# Patient Record
Sex: MALE | Race: ASIAN | NOT HISPANIC OR LATINO | URBAN - METROPOLITAN AREA
[De-identification: names, ages, dates, MRNs, and addresses within clinical notes are randomized per-mention and may not be internally consistent; named-entity substitution may affect disease eponyms.]

---

## 2017-03-19 ENCOUNTER — OFFICE VISIT (OUTPATIENT)
Dept: URGENT CARE | Facility: URGENT CARE | Age: 42
End: 2017-03-19
Payer: COMMERCIAL

## 2017-03-19 VITALS
DIASTOLIC BLOOD PRESSURE: 79 MMHG | WEIGHT: 142.6 LBS | HEART RATE: 106 BPM | TEMPERATURE: 99.2 F | OXYGEN SATURATION: 99 % | BODY MASS INDEX: 22.33 KG/M2 | SYSTOLIC BLOOD PRESSURE: 115 MMHG

## 2017-03-19 DIAGNOSIS — R52 BODY ACHES: Primary | ICD-10-CM

## 2017-03-19 DIAGNOSIS — J06.9 UPPER RESPIRATORY TRACT INFECTION, UNSPECIFIED TYPE: ICD-10-CM

## 2017-03-19 DIAGNOSIS — R07.0 THROAT PAIN: ICD-10-CM

## 2017-03-19 LAB
DEPRECATED S PYO AG THROAT QL EIA: NORMAL
FLUAV+FLUBV AG SPEC QL: NEGATIVE
FLUAV+FLUBV AG SPEC QL: NORMAL
MICRO REPORT STATUS: NORMAL
SPECIMEN SOURCE: NORMAL
SPECIMEN SOURCE: NORMAL

## 2017-03-19 PROCEDURE — 87081 CULTURE SCREEN ONLY: CPT | Performed by: PHYSICIAN ASSISTANT

## 2017-03-19 PROCEDURE — 87804 INFLUENZA ASSAY W/OPTIC: CPT | Performed by: PHYSICIAN ASSISTANT

## 2017-03-19 PROCEDURE — 87880 STREP A ASSAY W/OPTIC: CPT | Performed by: PHYSICIAN ASSISTANT

## 2017-03-19 PROCEDURE — 99213 OFFICE O/P EST LOW 20 MIN: CPT | Performed by: PHYSICIAN ASSISTANT

## 2017-03-19 RX ORDER — CODEINE PHOSPHATE AND GUAIFENESIN 10; 100 MG/5ML; MG/5ML
1-2 SOLUTION ORAL EVERY 4 HOURS PRN
Qty: 180 ML | Refills: 0 | Status: SHIPPED | OUTPATIENT
Start: 2017-03-19 | End: 2018-01-23

## 2017-03-19 RX ORDER — BENZONATATE 200 MG/1
200 CAPSULE ORAL 3 TIMES DAILY PRN
Qty: 20 CAPSULE | Refills: 0 | Status: SHIPPED | OUTPATIENT
Start: 2017-03-19 | End: 2018-01-23

## 2017-03-19 NOTE — NURSING NOTE
"Chief Complaint   Patient presents with     URI     started 3 days ago       Initial /79  Pulse 106  Temp 99.2  F (37.3  C) (Oral)  Wt 142 lb 9.6 oz (64.7 kg)  SpO2 99%  BMI 22.33 kg/m2 Estimated body mass index is 22.33 kg/(m^2) as calculated from the following:    Height as of 11/28/16: 5' 7\" (1.702 m).    Weight as of this encounter: 142 lb 9.6 oz (64.7 kg).  Medication Reconciliation: complete   Kiel Ballesteros MA        "

## 2017-03-19 NOTE — MR AVS SNAPSHOT
After Visit Summary   3/19/2017    Stacy Ferrer    MRN: [id number]           Patient Information     Date Of Birth          1975        Visit Information        Provider Department      3/19/2017 9:20 AM Moshe Falcon PA [facility]        Today's Diagnoses     Body aches    -  1    Throat pain        Upper respiratory tract infection, unspecified type          Care Instructions    Trial over the counter symptomatic relief, rest, and drink plenty of fluids. Salt water gargles and nasal lavage may also be helpful.  Return to clinic or Emergency Department for breathing/swallowing problems, fever >105, altered mental status, or worsening general condition.      Viral Respiratory Illness:  You have an Upper Respiratory Illness (URI) caused by a virus. This illness is contagious during the first few days. It is spread through the air by coughing and sneezing or by direct contact (touching the sick person and then touching your own eyes, nose or mouth). Most viral illnesses go away within 7-10 days with rest and simple home remedies. Sometimes, the illness may last for several weeks. Antibiotics will not kill a virus and are generally not prescribed for this condition.  Home Care:  1) If symptoms are severe, rest at home for the first 2-3 days. When you resume activity, don't let yourself get too tired.  2) Avoid being exposed to cigarette smoke (yours or others ).  3) Tylenol (acetaminophen) or ibuprofen (Advil, Motrin) will help fever, muscle aching and headache. (Persons under 18 with fever should not take aspirin since this may cause liver damage.)  4) Your appetite may be poor, so a light diet is fine. Avoid dehydration by drinking 6-8 glasses of fluids per day (water, soft, drinks, juices, tea, soup, etc.). Extra fluids will help loosen secretions in the nose and lungs.  5) Over-the-counter cold medicines will not shorten the length of time you re sick, but they may  be helpful for the following symptoms: cough (Robitussin DM); sore throat (Chloraseptic lozenges or spray); nasal and sinus congestion (Actifed, Sudafed, Chlortrimeton).  Follow Up  with your doctor or as advised if you don t improve over the next week.  Get Prompt Medical Attention  if any of the following occur:  -- Cough with lots of colored sputum (mucus) or blood in your sputum  -- Chest pain, shortness of breath, wheezing or have trouble breathing  -- Severe headache; face, neck or ear pain  -- Fever over 100.4  F (38.0  C) for more than three days  -- You can t swallow due to throat pain    [id number] [facility], [address] [place] [address], [place] [address]. All rights reserved. This information is not intended as a substitute for professional medical care. Always follow your healthcare professional's instructions.              Follow-ups after your visit        Follow-up notes from your care team     Return if symptoms worsen or fail to improve.      Who to contact     If you have questions or need follow up information about today's clinic visit or your schedule please contact [facility] directly at [phone].  Normal or non-critical lab and imaging results will be communicated to you by MyChart, letter or phone within 4 business days after the clinic has received the results. If you do not hear from us within 7 days, please contact the clinic through McAfeehart or phone. If you have a critical or abnormal lab result, we will notify you by phone as soon as possible.  Submit refill requests through Videum or call your pharmacy and they will forward the refill request to us. Please allow 3 business days for your refill to be completed.          Additional Information About Your Visit        MyChart Information     Videum lets you send messages to your doctor, view your test results, renew your prescriptions, schedule appointments and more. To sign up, go to [email or web address] .  "Click on \"Log in\" on the left side of the screen, which will take you to the Welcome page. Then click on \"Sign up Now\" on the right side of the page.     You will be asked to enter the access code listed below, as well as some personal information. Please follow the directions to create your username and password.     Your access code is: [email or web address]  Expires: 2017 10:46 AM     Your access code will  in 90 days. If you need help or a new code, please call your [place] clinic or [phone].        Care EveryWhere ID     This is your Care EveryWhere ID. This could be used by other organizations to access your [place] medical records  [id number]        Your Vitals Were     Pulse Temperature Pulse Oximetry BMI (Body Mass Index)          106 99.2  F (37.3  C) (Oral) 99% 22.33 kg/m2         Blood Pressure from Last 3 Encounters:   17 115/79   16 (!) 129/95   16 116/72    Weight from Last 3 Encounters:   17 142 lb 9.6 oz (64.7 kg)   16 143 lb (64.9 kg)   16 144 lb 9.6 oz (65.6 kg)              We Performed the Following     Influenza A/B antigen     Strep, Rapid Screen          Today's Medication Changes          These changes are accurate as of: 3/19/17 10:46 AM.  If you have any questions, ask your nurse or doctor.               Start taking these medicines.        Dose/Directions    benzonatate 200 MG capsule   Commonly known as:  TESSALON   Used for:  Upper respiratory tract infection, unspecified type   Started by:  Moshe Falcon PA        Dose:  200 mg   Take 1 capsule (200 mg) by mouth 3 times daily as needed for cough   Quantity:  20 capsule   Refills:  0       guaiFENesin-codeine 100-10 MG/5ML Soln solution   Commonly known as:  ROBITUSSIN AC   Used for:  Upper respiratory tract infection, unspecified type   Started by:  Moshe Falcon PA        Dose:  1-2 tsp.   Take 5-10 mLs by mouth every 4 hours as needed for cough (or pain) "   Quantity:  180 mL   Refills:  0       lidocaine visc 2% 2.5mL/5mL & maalox/mylanta w/ simeth 2.5mL/5mL & diphenhydrAMINE 5mg/5mL Susp suspension   Commonly known as:  MAGIC Mouthwash   Used for:  Upper respiratory tract infection, unspecified type   Started by:  Moshe Falcon PA        1:1:1  Si-3 tsp swish and spit every 4 hours prn pain.   Quantity:  150 mL   Refills:  1            Where to get your medicines      These medications were sent to [facility] - [place], [place] - [address]  [address], [facility] [address]     Phone:  [phone]     benzonatate 200 MG capsule    lidocaine visc 2% 2.5mL/5mL & maalox/mylanta w/ simeth 2.5mL/5mL & diphenhydrAMINE 5mg/5mL Susp suspension         Some of these will need a paper prescription and others can be bought over the counter.  Ask your nurse if you have questions.     Bring a paper prescription for each of these medications     guaiFENesin-codeine 100-10 MG/5ML Soln solution                Primary Care Provider Office Phone # Fax #    Shahab Pacheco [phone]       [facility] [address]  [facility] [address]        Thank you!     Thank you for choosing [facility]  for your care. Our goal is always to provide you with excellent care. Hearing back from our patients is one way we can continue to improve our services. Please take a few minutes to complete the written survey that you may receive in the mail after your visit with us. Thank you!             Your Updated Medication List - Protect others around you: Learn how to safely use, store and throw away your medicines at www.disposemymeds.org.          This list is accurate as of: 3/19/17 10:46 AM.  Always use your most recent med list.                   Brand Name Dispense Instructions for use    benzonatate 200 MG capsule    TESSALON    20 capsule    Take 1 capsule (200 mg) by mouth 3 times daily as  needed for cough       guaiFENesin-codeine 100-10 MG/5ML Soln solution    ROBITUSSIN AC    180 mL    Take 5-10 mLs by mouth every 4 hours as needed for cough (or pain)       IBUPROFEN IB OR      Take 2 tablets by mouth as needed.       lidocaine visc 2% 2.5mL/5mL & maalox/mylanta w/ simeth 2.5mL/5mL & diphenhydrAMINE 5mg/5mL Susp suspension    MAGIC Mouthwash    150 mL    1:1:1  Si-3 tsp swish and spit every 4 hours prn pain.       triamcinolone 0.1 % cream    KENALOG    15 g    Apply topically 3 times daily

## 2017-03-19 NOTE — PATIENT INSTRUCTIONS
Trial over the counter symptomatic relief, rest, and drink plenty of fluids. Salt water gargles and nasal lavage may also be helpful.  Return to clinic or Emergency Department for breathing/swallowing problems, fever >105, altered mental status, or worsening general condition.      Viral Respiratory Illness:  You have an Upper Respiratory Illness (URI) caused by a virus. This illness is contagious during the first few days. It is spread through the air by coughing and sneezing or by direct contact (touching the sick person and then touching your own eyes, nose or mouth). Most viral illnesses go away within 7-10 days with rest and simple home remedies. Sometimes, the illness may last for several weeks. Antibiotics will not kill a virus and are generally not prescribed for this condition.  Home Care:  1) If symptoms are severe, rest at home for the first 2-3 days. When you resume activity, don't let yourself get too tired.  2) Avoid being exposed to cigarette smoke (yours or others ).  3) Tylenol (acetaminophen) or ibuprofen (Advil, Motrin) will help fever, muscle aching and headache. (Persons under 18 with fever should not take aspirin since this may cause liver damage.)  4) Your appetite may be poor, so a light diet is fine. Avoid dehydration by drinking 6-8 glasses of fluids per day (water, soft, drinks, juices, tea, soup, etc.). Extra fluids will help loosen secretions in the nose and lungs.  5) Over-the-counter cold medicines will not shorten the length of time you re sick, but they may be helpful for the following symptoms: cough (Robitussin DM); sore throat (Chloraseptic lozenges or spray); nasal and sinus congestion (Actifed, Sudafed, Chlortrimeton).  Follow Up  with your doctor or as advised if you don t improve over the next week.  Get Prompt Medical Attention  if any of the following occur:  -- Cough with lots of colored sputum (mucus) or blood in your sputum  -- Chest pain, shortness of breath, wheezing or  have trouble breathing  -- Severe headache; face, neck or ear pain  -- Fever over 100.4  F (38.0  C) for more than three days  -- You can t swallow due to throat pain    [id number] [employer], [address] [place] [address], [place] [address]. All rights reserved. This information is not intended as a substitute for professional medical care. Always follow your healthcare professional's instructions.

## 2017-03-19 NOTE — PROGRESS NOTES
SUBJECTIVE:                                                    Stacy Ferrer is a 41 year old male who presents to clinic today for the following health issues:      RESPIRATORY SYMPTOMS      Duration: 3 days ago    Description  sore throat, mild cough, fever, chills, headache , body aches and stomach ache    Severity: severe    Accompanying signs and symptoms: None    History (predisposing factors):  none    Precipitating or alleviating factors: None    Therapies tried and outcome:  none               No Known Allergies    Past Medical History   Diagnosis Date     NO ACTIVE PROBLEMS          Current Outpatient Prescriptions on File Prior to Visit:  triamcinolone (KENALOG) 0.1 % cream Apply topically 3 times daily   IBUPROFEN IB OR Take 2 tablets by mouth as needed.      No current facility-administered medications on file prior to visit.     Social History   Substance Use Topics     Smoking status: Never Smoker     Smokeless tobacco: Never Used     Alcohol use No       ROS:  Consitutional: As above  ENT: As above  Respiratory: As above    OBJECTIVE:  /79  Pulse 106  Temp 99.2  F (37.3  C) (Oral)  Wt 142 lb 9.6 oz (64.7 kg)  SpO2 99%  BMI 22.33 kg/m2  GENERAL APPEARANCE: healthy, alert and no distress  EYES: conjunctiva clear  EARS:.   Ear canals w/o erythema, TM's intact w/o erythema.    NOSE/MOUTH: Nose and mouth without ulcers, erythema or lesions  THROAT: mod erythema w/ no tonsillar enlargement . no exudates  NECK: supple, nontender, no lymphadenopathy  RESP: lungs clear to auscultation - no rales, rhonchi or wheezes  CV: regular rates and rhythm, normal S1 S2, no murmur noted  NEURO: awake, alert  , no menigmus, CN 2-12 intact grossly. Gait intact      Rapid Strep test: Negative  INFLU NEG    ASSESSMENT: Well appearing.    ICD-10-CM    1. Body aches R52 Influenza A/B antigen   2. Throat pain R07.0 Strep, Rapid Screen   3. Upper respiratory tract infection, unspecified type J06.9 benzonatate (TESSALON) 200  MG capsule     guaiFENesin-codeine (ROBITUSSIN AC) 100-10 MG/5ML SOLN solution     magic mouthwash ([facility]) suspension (diphenhydrAMINE, lidocaine, aluminum-magnesium & simethicone)         PLAN:Advised patient is overdue for Hep B carrier surveillance and should f/u with his GI  Lots of rest and fluids.  RTC if any worsening symptoms or if not improving.    Peter Falcon PA-C

## 2017-03-19 NOTE — LETTER
[facility]  [address]  [facility] [address]  Phone: [phone]    March 19, 2017        Stacy Ferrer  [address]  [facility] [id number]          To whom it may concern:    RE: Stacy Ferrer    Patient was seen and treated today at our clinic.  Patient excused from work 3/19/17 through 3/22/17 unless feeling better sooner.    Patient may return to work without restrictions.         Please contact me for questions or concerns.      Sincerely,        PJ Nichole

## 2017-03-21 LAB
BACTERIA SPEC CULT: NORMAL
MICRO REPORT STATUS: NORMAL
SPECIMEN SOURCE: NORMAL

## 2017-03-21 NOTE — PROGRESS NOTES
MrRe Ferrer,    Your test was normal.    Please contact the clinic if you have additional questions or if symptoms persist.  Thank you.    Sincerely,    Moshe Falcon

## 2017-03-25 ENCOUNTER — OFFICE VISIT (OUTPATIENT)
Dept: URGENT CARE | Facility: URGENT CARE | Age: 42
End: 2017-03-25
Payer: COMMERCIAL

## 2017-03-25 VITALS
OXYGEN SATURATION: 98 % | HEART RATE: 98 BPM | TEMPERATURE: 98.4 F | SYSTOLIC BLOOD PRESSURE: 114 MMHG | DIASTOLIC BLOOD PRESSURE: 78 MMHG | BODY MASS INDEX: 22.55 KG/M2 | WEIGHT: 144 LBS

## 2017-03-25 DIAGNOSIS — J06.9 VIRAL URI WITH COUGH: Primary | ICD-10-CM

## 2017-03-25 PROCEDURE — 99213 OFFICE O/P EST LOW 20 MIN: CPT | Performed by: FAMILY MEDICINE

## 2017-03-25 NOTE — MR AVS SNAPSHOT
"              After Visit Summary   3/25/2017    Stacy Ferrer    MRN: [id number]           Patient Information     Date Of Birth          1975        Visit Information        Provider Department      3/25/2017 11:50 AM Zino Pressley MD [facility]        Today's Diagnoses     Viral URI with cough    -  1       Follow-ups after your visit        Who to contact     If you have questions or need follow up information about today's clinic visit or your schedule please contact [facility] directly at [phone].  Normal or non-critical lab and imaging results will be communicated to you by BioGasolhart, letter or phone within 4 business days after the clinic has received the results. If you do not hear from us within 7 days, please contact the clinic through BioGasolhart or phone. If you have a critical or abnormal lab result, we will notify you by phone as soon as possible.  Submit refill requests through Avaamo or call your pharmacy and they will forward the refill request to us. Please allow 3 business days for your refill to be completed.          Additional Information About Your Visit        MyChart Information     Avaamo lets you send messages to your doctor, view your test results, renew your prescriptions, schedule appointments and more. To sign up, go to [email or web address] . Click on \"Log in\" on the left side of the screen, which will take you to the Welcome page. Then click on \"Sign up Now\" on the right side of the page.     You will be asked to enter the access code listed below, as well as some personal information. Please follow the directions to create your username and password.     Your access code is: [email or web address]  Expires: 2017 10:46 AM     Your access code will  in 90 days. If you need help or a new code, please call your [facility] or [phone].        Care EveryWhere ID     This is your Care EveryWhere ID. This could be used by other " organizations to access your [place] medical records  [id number]        Your Vitals Were     Pulse Temperature Pulse Oximetry BMI (Body Mass Index)          98 98.4  F (36.9  C) (Oral) 98% 22.55 kg/m2         Blood Pressure from Last 3 Encounters:   17 114/78   17 115/79   16 (!) 129/95    Weight from Last 3 Encounters:   17 144 lb (65.3 kg)   17 142 lb 9.6 oz (64.7 kg)   16 143 lb (64.9 kg)              Today, you had the following     No orders found for display       Primary Care Provider Office Phone # Fax #    Shahab Pacheco [phone]       [facility] [address] [facility] [address]        Thank you!     Thank you for choosing [facility]  for your care. Our goal is always to provide you with excellent care. Hearing back from our patients is one way we can continue to improve our services. Please take a few minutes to complete the written survey that you may receive in the mail after your visit with us. Thank you!             Your Updated Medication List - Protect others around you: Learn how to safely use, store and throw away your medicines at www.disposemymeds.org.          This list is accurate as of: 3/25/17 12:56 PM.  Always use your most recent med list.                   Brand Name Dispense Instructions for use    benzonatate 200 MG capsule    TESSALON    20 capsule    Take 1 capsule (200 mg) by mouth 3 times daily as needed for cough       guaiFENesin-codeine 100-10 MG/5ML Soln solution    ROBITUSSIN AC    180 mL    Take 5-10 mLs by mouth every 4 hours as needed for cough (or pain)       IBUPROFEN IB OR      Take 2 tablets by mouth as needed.       lidocaine visc 2% 2.5mL/5mL & maalox/mylanta w/ simeth 2.5mL/5mL & diphenhydrAMINE 5mg/5mL Susp suspension    MAGIC Mouthwash    150 mL    1:1:1  Si-3 tsp swish and spit every 4 hours prn pain.       triamcinolone 0.1 % cream    KENALOG    15 g    Apply  topically 3 times daily

## 2017-03-25 NOTE — PROGRESS NOTES
"No chief complaint on file.      Initial There were no vitals taken for this visit. Estimated body mass index is 22.33 kg/(m^2) as calculated from the following:    Height as of 11/28/16: 5' 7\" (1.702 m).    Weight as of 3/19/17: 142 lb 9.6 oz (64.7 kg).  Medication Reconciliation: miguel a Zhu CMA      Some of this note was populated by a medical assistant.    SUBJECTIVE:                                                    Stacy Ferrer is a 41 year old male who presents to clinic today for the following health issues:      RESPIRATORY SYMPTOMS      Duration: 10 days    Description  sore throat, chills, ear pain right, headache, fatigue/malaise, myalgias and nausea    Severity: moderate    Accompanying signs and symptoms: None    History (predisposing factors):  none    Precipitating or alleviating factors: None    Therapies tried and outcome:  rest and fluids acetaminophen OTC NSAID         Problem list and histories reviewed & adjusted, as indicated.  Additional history: as documented    Patient Active Problem List   Diagnosis     Viral hepatitis B chronic (H)     CARDIOVASCULAR SCREENING; LDL GOAL LESS THAN 160     Elevated blood pressure reading without diagnosis of hypertension     Past Surgical History:   Procedure Laterality Date     NO HISTORY OF SURGERY         Social History   Substance Use Topics     Smoking status: Never Smoker     Smokeless tobacco: Never Used     Alcohol use No     Family History   Problem Relation Age of Onset     Hypertension Mother      CEREBROVASCULAR DISEASE Mother      Asthma No family hx of      C.A.D. No family hx of      DIABETES No family hx of      Breast Cancer No family hx of      Cancer - colorectal No family hx of      Prostate Cancer No family hx of          Current Outpatient Prescriptions   Medication Sig Dispense Refill     IBUPROFEN IB OR Take 2 tablets by mouth as needed.        benzonatate (TESSALON) 200 MG capsule Take 1 capsule (200 mg) by mouth 3 times " daily as needed for cough (Patient not taking: Reported on 3/25/2017) 20 capsule 0     guaiFENesin-codeine (ROBITUSSIN AC) 100-10 MG/5ML SOLN solution Take 5-10 mLs by mouth every 4 hours as needed for cough (or pain) (Patient not taking: Reported on 3/25/2017) 180 mL 0     magic mouthwash (HOSPITAL) suspension (diphenhydrAMINE, lidocaine, aluminum-magnesium & simethicone) 1:1:1   Si-3 tsp swish and spit every 4 hours prn pain. (Patient not taking: Reported on 3/25/2017) 150 mL 1     triamcinolone (KENALOG) 0.1 % cream Apply topically 3 times daily (Patient not taking: Reported on 3/25/2017) 15 g 1     No Known Allergies    Reviewed and updated as needed this visit by clinical staff       Reviewed and updated as needed this visit by Provider         ROS:  Constitutional, HEENT, cardiovascular, pulmonary, gi and gu systems are negative, except as otherwise noted.    OBJECTIVE:                                                    /78 (BP Location: Left arm, Patient Position: Chair, Cuff Size: Adult Regular)  Pulse 98  Temp 98.4  F (36.9  C) (Oral)  Wt 144 lb (65.3 kg)  SpO2 98%  BMI 22.55 kg/m2  Body mass index is 22.55 kg/(m^2).  GENERAL: healthy, alert and no distress  NECK: no adenopathy, no asymmetry, masses, or scars and thyroid normal to palpation  RESP: lungs clear to auscultation - no rales, rhonchi or wheezes  CV: regular rate and rhythm, normal S1 S2, no S3 or S4, no murmur, click or rub, no peripheral edema and peripheral pulses strong  ABDOMEN: soft, nontender, no hepatosplenomegaly, no masses and bowel sounds normal  MS: no gross musculoskeletal defects noted, no edema    Diagnostic Test Results:  none      ASSESSMENT/PLAN:                                                        ICD-10-CM    1. Viral URI with cough J06.9     B97.89         PLAN  Patient educational/instructional material provided including reasons for follow-up   The patient indicates understanding of these issues and agrees  with the plan.    Zion Pressley MD      [facility]

## 2018-01-23 ENCOUNTER — OFFICE VISIT (OUTPATIENT)
Dept: FAMILY MEDICINE | Facility: CLINIC | Age: 43
End: 2018-01-23
Payer: COMMERCIAL

## 2018-01-23 VITALS
BODY MASS INDEX: 23.26 KG/M2 | HEIGHT: 67 IN | RESPIRATION RATE: 16 BRPM | TEMPERATURE: 97.6 F | HEART RATE: 88 BPM | OXYGEN SATURATION: 99 % | SYSTOLIC BLOOD PRESSURE: 126 MMHG | WEIGHT: 148.2 LBS | DIASTOLIC BLOOD PRESSURE: 88 MMHG

## 2018-01-23 DIAGNOSIS — R51.9 NONINTRACTABLE EPISODIC HEADACHE, UNSPECIFIED HEADACHE TYPE: Primary | ICD-10-CM

## 2018-01-23 LAB — ERYTHROCYTE [SEDIMENTATION RATE] IN BLOOD BY WESTERGREN METHOD: 8 MM/H (ref 0–15)

## 2018-01-23 PROCEDURE — 36415 COLL VENOUS BLD VENIPUNCTURE: CPT | Performed by: FAMILY MEDICINE

## 2018-01-23 PROCEDURE — 99213 OFFICE O/P EST LOW 20 MIN: CPT | Performed by: FAMILY MEDICINE

## 2018-01-23 PROCEDURE — 85652 RBC SED RATE AUTOMATED: CPT | Performed by: FAMILY MEDICINE

## 2018-01-23 ASSESSMENT — PAIN SCALES - GENERAL: PAINLEVEL: SEVERE PAIN (6)

## 2018-01-23 NOTE — LETTER
January 23, 2018      Stacy Ferrer  [address]  [place] [address]        Dear [facility],    Your inflammatory lyssa test, ESR, was normal.    Sincerely,      Shahab Pacheco MD/surendra    Resulted Orders   Erythrocyte sedimentation rate auto   Result Value Ref Range    Sed Rate 8 0 - 15 mm/h

## 2018-01-23 NOTE — LETTER
[address] [address]  Phone: [phone]    January 23, 2018        Stacy Ferrer  [address]  [facility] [address]          To whom it may concern:    RE: Stacy Ferrer    Patient was seen and treated today at our clinic and missed work.  Patient may return to work 1/26/2018 with the following:  No working or lifting restrictions    Please contact me for questions or concerns.      Sincerely,        Shahab Pacheco MD

## 2018-01-23 NOTE — NURSING NOTE
"Chief Complaint   Patient presents with     Headache       Initial /88 (BP Location: Left arm, Patient Position: Sitting, Cuff Size: Adult Regular)  Pulse 88  Temp 97.6  F (36.4  C) (Oral)  Resp 16  Ht 5' 7\" (1.702 m)  Wt 148 lb 3.2 oz (67.2 kg)  SpO2 99%  BMI 23.21 kg/m2 Estimated body mass index is 23.21 kg/(m^2) as calculated from the following:    Height as of this encounter: 5' 7\" (1.702 m).    Weight as of this encounter: 148 lb 3.2 oz (67.2 kg).  Medication Reconciliation: complete     Will Consuelo BURNETT      "

## 2018-01-23 NOTE — PROGRESS NOTES
SUBJECTIVE:   Stacy Ferrer is a 42 year old male who presents to clinic today for the following health issues:      Headache  Onset: 2 days    Description:   Location: started R temporal/eye area - now on posterior portion of skull   Character: squeezing pain  Frequency:  intermittent  Duration:  NA    Intensity: 6/10    Progression of Symptoms:  worsening    Accompanying Signs & Symptoms:  Stiff neck: no  Neck or upper back pain: no  Fever: no  Sinus pressure: no  Nausea or vomiting: no  Dizziness: no  Numbness: no  Weakness: no  Visual changes: no    History:   Head trauma: no  Family history of migraines: no  Previous tests for headaches: no  Neurologist evaluations: no  Able to do daily activities: YES  Wake with a headaches: no  Do headaches wake you up: no  Daily pain medication use: YES - Ibuprofen  Work/school stressors/changes: no    Precipitating factors:   Does light make it worse: no  Does sound make it worse: no    Alleviating factors:  Does sleep help: YES    Therapies Tried and outcome: pressure - helps (eg - pushing head against wall)      Problem list and histories reviewed & adjusted, as indicated.  Additional history: as documented    Patient Active Problem List   Diagnosis     Viral hepatitis B chronic (H)     CARDIOVASCULAR SCREENING; LDL GOAL LESS THAN 160     Elevated blood pressure reading without diagnosis of hypertension     Past Surgical History:   Procedure Laterality Date     NO HISTORY OF SURGERY         Social History   Substance Use Topics     Smoking status: Never Smoker     Smokeless tobacco: Never Used     Alcohol use No     Family History   Problem Relation Age of Onset     Hypertension Mother      CEREBROVASCULAR DISEASE Mother      Asthma No family hx of      C.A.D. No family hx of      DIABETES No family hx of      Breast Cancer No family hx of      Cancer - colorectal No family hx of      Prostate Cancer No family hx of              Reviewed and updated as needed this visit by  "clinical staffTobacco  Allergies  Meds  Med Hx  Surg Hx  Fam Hx  Soc Hx      Reviewed and updated as needed this visit by Provider         ROS:  Constitutional, HEENT, cardiovascular, pulmonary, GI, , musculoskeletal, neuro, skin, endocrine and psych systems are negative, except as otherwise noted.      OBJECTIVE:   /88 (BP Location: Left arm, Patient Position: Sitting, Cuff Size: Adult Regular)  Pulse 88  Temp 97.6  F (36.4  C) (Oral)  Resp 16  Ht 5' 7\" (1.702 m)  Wt 148 lb 3.2 oz (67.2 kg)  SpO2 99%  BMI 23.21 kg/m2  Body mass index is 23.21 kg/(m^2).  GENERAL: healthy, alert and no distress  NECK: no adenopathy, no asymmetry, masses, or scars and thyroid normal to palpation  RESP: lungs clear to auscultation - no rales, rhonchi or wheezes  CV: regular rate and rhythm, normal S1 S2, no S3 or S4, no murmur, click or rub, no peripheral edema and peripheral pulses strong  ABDOMEN: soft, nontender, no hepatosplenomegaly, no masses and bowel sounds normal  MS: no gross musculoskeletal defects noted, no edema  NEURO: Normal strength and tone, mentation intact and speech normal    Diagnostic Test Results:  none     ASSESSMENT/PLAN:       1. Nonintractable episodic headache, unspecified headache type  Unclear etiology? R/o temporal arteritis. MRI brain ordered r/o organic causes. Referred to Neurology.  - MR Brain w/o & w Contrast; Future  - NEUROLOGY ADULT REFERRAL  - Erythrocyte sedimentation rate auto    See Patient Instructions    Shahab Pacheco MD, MD  [facility]    "

## 2018-06-13 ENCOUNTER — OFFICE VISIT (OUTPATIENT)
Dept: FAMILY MEDICINE | Facility: CLINIC | Age: 43
End: 2018-06-13
Payer: COMMERCIAL

## 2018-06-13 VITALS
HEIGHT: 67 IN | BODY MASS INDEX: 23.23 KG/M2 | HEART RATE: 65 BPM | DIASTOLIC BLOOD PRESSURE: 85 MMHG | WEIGHT: 148 LBS | RESPIRATION RATE: 20 BRPM | SYSTOLIC BLOOD PRESSURE: 124 MMHG | TEMPERATURE: 98.1 F | OXYGEN SATURATION: 100 %

## 2018-06-13 DIAGNOSIS — M54.41 ACUTE BILATERAL LOW BACK PAIN WITH RIGHT-SIDED SCIATICA: Primary | ICD-10-CM

## 2018-06-13 PROCEDURE — 99214 OFFICE O/P EST MOD 30 MIN: CPT | Performed by: NURSE PRACTITIONER

## 2018-06-13 RX ORDER — HYDROCODONE BITARTRATE AND ACETAMINOPHEN 5; 325 MG/1; MG/1
1 TABLET ORAL EVERY 6 HOURS PRN
Qty: 10 TABLET | Refills: 0 | Status: SHIPPED | OUTPATIENT
Start: 2018-06-13 | End: 2018-06-27

## 2018-06-13 RX ORDER — CYCLOBENZAPRINE HCL 10 MG
5-10 TABLET ORAL 3 TIMES DAILY PRN
Qty: 30 TABLET | Refills: 0 | Status: SHIPPED | OUTPATIENT
Start: 2018-06-13 | End: 2018-06-27

## 2018-06-13 RX ORDER — METHYLPREDNISOLONE 4 MG
TABLET, DOSE PACK ORAL
Qty: 21 TABLET | Refills: 0 | Status: SHIPPED | OUTPATIENT
Start: 2018-06-13 | End: 2018-06-27

## 2018-06-13 ASSESSMENT — PAIN SCALES - GENERAL: PAINLEVEL: EXTREME PAIN (9)

## 2018-06-13 NOTE — LETTER
June 13, 2018      Stacy Ferrer  [address]  [place] [facility] [address]        To Whom It May Concern:    Stacy Ferrer  was seen on 6/13/18 for an injury. If he returns to work, he must not lift more than 10 lb, no repetitive movements, no twisting or bending. If a job cannot be found to meet these parameters, he may not work until he has been re-evaluated in 1-2 weeks.        Sincerely,        CR Acosta CNP

## 2018-06-13 NOTE — PATIENT INSTRUCTIONS
Ice 15 minutes 4-6 times daily for the next 2 days and then use ice or heat  Gentle stretching and movement  Start Medrol dose sanjay  Start cyclobenzaprine (Flexeril) 0.5 - 1 tablet every 8 hours as needed for muscle pain/spasms. This is a muscle relaxer. No driving, operating machinery or drinking alcohol while taking.  Start acetaminophen/hydrocodone (Norco) 1 tab every 6 hours as needed for severe pain. No driving, operating machinery or drinking alcohol while taking.  Follow up in 1-2 weeks  Note written for work  If you develop loss of bowel or bladder, saddle anesthesia, or foot drop, go to emergency department.        At [facility], we strive to deliver an exceptional experience to you, every time we see you.  If you receive a survey in the mail, please send us back your thoughts. We really do value your feedback.    Based on your medical history, these are the current health maintenance/preventive care services that you are due for (some may have been done at this visit.)  Health Maintenance Due   Topic Date Due     HIV SCREEN (SYSTEM ASSIGNED)  06/05/1992     LIPID SCREEN Q5 YR MALE (SYSTEM ASSIGNED)  06/05/2009         Suggested websites for health information:  Www.[facility] HospitalPathgather.[employer] : Up to date and easily searchable information on multiple topics.  Www.medlineplus.gov : medication info, interactive tutorials, watch real surgeries online  Www.familydoctor.org : good info from the Academy of Family Physicians  Www.cdc.gov : public health info, travel advisories, epidemics (H1N1)  Www.aap.org : children's health info, normal development, vaccinations  Www.health.[place] : [place] dept of health, public health issues in [place], N1N1    Your care team:                            Family Medicine Internal Medicine   MD David San MD Shantel Branch-Fleming, MD Katya Georgiev PA-C Nam Ho, MD Pediatrics   KEVIN Weems, YAYA HANKINS CNP    MD Elysia Woodall MD Deborah Mielke, MD Kim Thein, APRN CNP      Clinic hours: Monday - Thursday 7 am-7 pm; Fridays 7 am-5 pm.   Urgent care: Monday - Friday 11 am-9 pm; Saturday and Sunday 9 am-5 pm.  Pharmacy : Monday -Thursday 8 am-8 pm; Friday 8 am-6 pm; Saturday and Sunday 9 am-5 pm.     Clinic: [phone]   Pharmacy: [phone]    ?au c?/l?ng [Gilmer kruger]    ?au c? và ?au l?ng ??u th??ng hayes?t phát t? vi?c b? th??ng ? các c? ho?c dây ch?ng vùng c?t s?ng. ?ôi khi ??a ??m có ch?c n?ng tách các ??t x??ng s?t có th? gây ?âu do t?o l?c ép lên dây th?n kinh g?n nó. ?au c? và ?au l?ng có th? hayes?t hi?n judith m?t l?c v?n mình/co mình ??t ng?t (ch?ng h?n nh? cassidy m?t milagro n?n xe h?i) ho?c ?ôi khi ch? do m?t trevon?n ??ng v?ng v? ??n thu?n. Dù cassidy tr??ng h?p nào, c? th??ng b? co c?ng và khi?n c?n ?au t?ng thêm.  ?au c? và ?au l?ng c?p tính th??ng ?? judith m?t ??n daniela tu?n. ?au liên rachel t?i b?nh ? ??a ??m, viêm kh?p (arthritis) t?i các kh?p c?t s?ng ho?c chít h?p (stenosis) c?t s?ng (h?p c?t s?ng) có th? tr? thành mãn tính và kéo dài hàng tháng, th?m chí hàng n?m.  Ch?m sóc t?i lux:    ??I V?I ?AU C?: S? d?ng m?t chi?c g?i m?m ?? ?? ??u và gi? c?t s?ng ? v? trí ngh?. V? trí ??u không nên ?? nghiêng v? phía tr??c hay phía judith.    ??I V?I ?AU L?NG: Quý v? có th? c?n ph?i ? trên gi??ng cassidy vài ngày ??u tiên. Nh?ng hãy b?t ??u ng?i d?y ho?c ?i l?i càng s?m càng t?t ?? tránh các v?n ?? phát sinh do ngh? lâu ? trên d??ng (y?u c?, tình tr?ng c?ng và ?au l?ng t? h?n, hình thành c?c máu ?ông ? chân).    Khi ? trên gi??ng, c? tìm m?t v? trí tho?i mái. S? d?ng n?m c?ng là t?t nh?t. C? g?ng n?m th?ng l?ng và ?? g?i d??i ??u g?i c?a quý v?. Quý v? c?ng có th? th? n?m nghiêng ng??i, ??u g?i co v? phía ng?c và ??t g?i gi?a daniela ??u g?i.    Tránh ng?i lâu cassidy m?t t? th?. Làm v?y s? t?o áp l?c lên ph?n d??i l?ng lennox?u h?n so v?i khi ??ng ho?c ?i b?.    Cassidy daniela ngày ??u, ch??m TÚI ?Á vào vùng ?au chadwick?ng 20  phút judith 2-4 gi?. Làm v?y s? gi?m s?ng và ?au. ALEXANDRA?T (t?m n??c nóng, ngâm n??c nóng ho?c t?m ch??m alexandra?t) có tác d?ng t?t ??i v?i tình tr?ng co c?ng c?. Quý v? có th? b?t ??u v?i ?á r?i trevon?n sang alexandra?t judith daniela ngày. M?t s? b?nh nhân c?m th?y t?t nh?t khi thay th? gi?a vi?c ?i?u tr? b?ng ?á và alexandra?t. S? d?ng ph??ng pháp nào mà quý v? c?m th?y t?t nh?t.    Quý v? có th? dùng acetaminophen (Tylenol) ho?c ibuprofen (Motrin, Advil) ?? ki?m soát c?n ?au, tr? khi ???c kê ??n m?t lo?i thu?c gi?m ?au khác. [L?U Ý: N?u quý v? m?c b?nh leah ho?c th?n mãn tính ho?c t?ng b? loét d? dày ho?c ch?y máu cassidy meng t? (GI), hãy nói trevon?n v?i bác s? c?a quý v? tr??c khi s? d?ng các lo?i thu?c này.]    Chú ý t?i các ph??ng pháp nâng vác an toàn và không ???c bê ?? n?ng quá 15 pound (6,8 kg) t?i khi c?n ?au ?ã h?t.  Almas dõi  cùng bác s? c?a quý v? ho?c c? s? này n?u các tri?u ch?ng c?a quý v? không c?i thi?n judith m?t tu?n. Có th? ph?i c?n t?i v?t lý tr? li?u ho?c ki?m tra b? sung.  [L?U Ý: N?u có ch?p X-sanford, phim s? ???c xem xét b?i m?t bác s? ch?p X-sanford. Quý v? s? ???c thông báo v? m?i phát hi?n m?i mà có th? ?nh h??ng t?i vi?c ch?m sóc quý v?.]  Tìm s? rachel tâm y t? ngay l?p t?c  n?u b?t k? ?i?u nào judith ?ây x?y ra:    C?n ?au tr? nên x?u ?i ho?c cherrie ra cánh glenys ho?c chân    M?t ho?c c? daniela glenys ho?c chân b? y?u t? ho?c ?au    M?t ki?m soát bàng sanford ho?c ru?t    C?m giác tê ? háng    Khó ?i b?    S?t 100,4 F (38 C) ho?c golden h?n ho?c almas ch? d?n c?a nhân viên y t?  Date Last Reviewed: 11/12/2013 2000-2017 [employer]. [address] [place] [address], [place] [address]. All rights reserved. This information is not intended as a substitute for professional medical care. Always follow your healthcare professional's instructions.

## 2018-06-13 NOTE — MR AVS SNAPSHOT
After Visit Summary   6/13/2018    Stacy Ferrer    MRN: [id number]           Patient Information     Date Of Birth          1975        Visit Information        Provider Department      6/13/2018 8:20 AM Alma Duque APRN CNP [facility]        Today's Diagnoses     Acute bilateral low back pain with right-sided sciatica    -  1      Care Instructions    Ice 15 minutes 4-6 times daily for the next 2 days and then use ice or heat  Gentle stretching and movement  Start Medrol dose sanjay  Start cyclobenzaprine (Flexeril) 0.5 - 1 tablet every 8 hours as needed for muscle pain/spasms. This is a muscle relaxer. No driving, operating machinery or drinking alcohol while taking.  Start acetaminophen/hydrocodone (Norco) 1 tab every 6 hours as needed for severe pain. No driving, operating machinery or drinking alcohol while taking.  Follow up in 1-2 weeks  Note written for work  If you develop loss of bowel or bladder, saddle anesthesia, or foot drop, go to emergency department.        At [facility], we strive to deliver an exceptional experience to you, every time we see you.  If you receive a survey in the mail, please send us back your thoughts. We really do value your feedback.    Based on your medical history, these are the current health maintenance/preventive care services that you are due for (some may have been done at this visit.)  Health Maintenance Due   Topic Date Due     HIV SCREEN (SYSTEM ASSIGNED)  06/05/1992     LIPID SCREEN Q5 YR MALE (SYSTEM ASSIGNED)  06/05/2009         Suggested websites for health information:  Www.[employer] : Up to date and easily searchable information on multiple topics.  Www.medlineplus.gov : medication info, interactive tutorials, watch real surgeries online  Www.familydoctor.org : good info from the Academy of Family Physicians  Www.cdc.gov : public health info, travel advisories, epidemics (H1N1)  Www.aap.org :  children's health info, normal development, vaccinations  Www.health.[facility].[place].us : [place] dept of health, public health issues in [place], N1N1    Your care team:                            Family Medicine Internal Medicine   MD David San MD Shantel Branch-Fleming, MD Katya Georgiev PA-C Nam Ho, MD Pediatrics   KEVIN Weems, MD Elysia Myles CNP, MD Deborah Mielke, MD Kim Thein, APRBALTAZAR JAVIER      Clinic hours: Monday - Thursday 7 am-7 pm; Fridays 7 am-5 pm.   Urgent care: Monday - Friday 11 am-9 pm; Saturday and Sunday 9 am-5 pm.  Pharmacy : Monday -Thursday 8 am-8 pm; Friday 8 am-6 pm; Saturday and Sunday 9 am-5 pm.     Clinic: [phone]   Pharmacy: [phone]    ?au c?/l?ng [Gilmer kruger]    ?au c? và ?au l?ng ??u th??ng hayes?t phát t? vi?c b? th??ng ? các c? ho?c dây ch?ng vùng c?t s?ng. ?ôi khi ??a ??m có ch?c n?ng tách các ??t x??ng s?t có th? gây ?âu do t?o l?c ép lên dây th?n kinh g?n nó. ?au c? và ?au l?ng có th? hayes?t hi?n judith m?t l?c v?n mình/co mình ??t ng?t (ch?ng h?n nh? cassidy m?t milagro n?n xe h?i) ho?c ?ôi khi ch? do m?t trevon?n ??ng v?ng v? ??n thu?n. Dù cassidy tr??ng h?p nào, c? th??ng b? co c?ng và khi?n c?n ?au t?ng thêm.  ?au c? và ?au l?ng c?p tính th??ng ?? judith m?t ??n daniela tu?n. ?au liên rachel t?i b?nh ? ??a ??m, viêm kh?p (arthritis) t?i các kh?p c?t s?ng ho?c chít h?p (stenosis) c?t s?ng (h?p c?t s?ng) có th? tr? thành mãn tính và kéo dài hàng tháng, th?m chí hàng n?m.  Ch?m sóc t?i lux:    ??I V?I ?AU C?: S? d?ng m?t chi?c g?i m?m ?? ?? ??u và gi? c?t s?ng ? v? trí ngh?. V? trí ??u không nên ?? nghiêng v? phía tr??c hay phía judith.    ??I V?I ?AU L?NG: Quý v? có th? c?n ph?i ? trên gi??ng cassidy vài ngày ??u tiên. Nh?ng hãy b?t ??u ng?i d?y ho?c ?i l?i càng s?m càng t?t ?? tránh các v?n ?? phát sinh do ngh? lâu ? trên d??ng (y?u c?, tình tr?ng c?ng và ?au l?ng t? h?n, hình thành c?c máu ?ông ? chân).    Khi ?  trên gi??ng, c? tìm m?t v? trí tho?i mái. S? d?ng n?m c?ng là t?t nh?t. C? g?ng n?m th?ng l?ng và ?? g?i d??i ??u g?i c?a quý v?. Quý v? c?ng có th? th? n?m nghiêng ng??i, ??u g?i co v? phía ng?c và ??t g?i gi?a daniela ??u g?i.    Tránh ng?i lâu cassidy m?t t? th?. Làm v?y s? t?o áp l?c lên ph?n d??i l?ng alexandra?u h?n so v?i khi ??ng ho?c ?i b?.    Cassidy daniela ngày ??u, ch??m TÚI ?Á vào vùng ?au chadwick?ng 20 phút judith 2-4 gi?. Làm v?y s? gi?m s?ng và ?au. ALEXANDRA?T (t?m n??c nóng, ngâm n??c nóng ho?c t?m ch??m alexandra?t) có tác d?ng t?t ??i v?i tình tr?ng co c?ng c?. Quý v? có th? b?t ??u v?i ?á r?i trevon?n sang alexandra?t judith daniela ngày. M?t s? b?nh nhân c?m th?y t?t nh?t khi thay th? gi?a vi?c ?i?u tr? b?ng ?á và alexandra?t. S? d?ng ph??ng pháp nào mà quý v? c?m th?y t?t nh?t.    Quý v? có th? dùng acetaminophen (Tylenol) ho?c ibuprofen (Motrin, Advil) ?? ki?m soát c?n ?au, tr? khi ???c kê ??n m?t lo?i thu?c gi?m ?au khác. [L?U Ý: N?u quý v? m?c b?nh leah ho?c th?n mãn tính ho?c t?ng b? loét d? dày ho?c ch?y máu cassidy meng t? (GI), hãy nói trevon?n v?i bác s? c?a quý v? tr??c khi s? d?ng các lo?i thu?c này.]    Chú ý t?i các ph??ng pháp nâng vác an toàn và không ???c bê ?? n?ng quá 15 pound (6,8 kg) t?i khi c?n ?au ?ã h?t.  Almas dõi  cùng bác s? c?a quý v? ho?c c? s? này n?u các tri?u ch?ng c?a quý v? không c?i thi?n judith m?t tu?n. Có th? ph?i c?n t?i v?t lý tr? li?u ho?c ki?m tra b? sung.  [L?U Ý: N?u có ch?p X-sanford, phim s? ???c xem xét b?i m?t bác s? ch?p X-sanford. Quý v? s? ???c thông báo v? m?i phát hi?n m?i mà có th? ?nh h??ng t?i vi?c ch?m sóc quý v?.]  Tìm s? rachel tâm y t? ngay l?p t?c  n?u b?t k? ?i?u nào judith ?ây x?y ra:    C?n ?au tr? nên x?u ?i ho?c cherrie ra cánh glenys ho?c chân    M?t ho?c c? daniela glenys ho?c chân b? y?u t? ho?c ?au    M?t ki?m soát bàng sanford ho?c ru?t    C?m giác tê ? háng    Khó ?i b?    S?t 100,4 F (38 C) ho?c golden h?n ho?c almas ch? d?n c?a nhân viên y t?  Date Last Reviewed: 11/12/2013 2000-2017 [employer] [address]  "[place] [address]. All rights reserved. This information is not intended as a substitute for professional medical care. Always follow your healthcare professional's instructions.                Follow-ups after your visit        Who to contact     If you have questions or need follow up information about today's clinic visit or your schedule please contact [facility] directly at [phone].  Normal or non-critical lab and imaging results will be communicated to you by MyChart, letter or phone within 4 business days after the clinic has received the results. If you do not hear from us within 7 days, please contact the clinic through MyChart or phone. If you have a critical or abnormal lab result, we will notify you by phone as soon as possible.  Submit refill requests through [employer] or call your pharmacy and they will forward the refill request to us. Please allow 3 business days for your refill to be completed.          Additional Information About Your Visit        Care EveryWhere ID     This is your Care EveryWhere ID. This could be used by other organizations to access your [facility] medical records  [id number]        Your Vitals Were     Pulse Temperature Respirations Height Pulse Oximetry BMI (Body Mass Index)    65 98.1  F (36.7  C) (Oral) 20 5' 7\" (1.702 m) 100% 23.18 kg/m2       Blood Pressure from Last 3 Encounters:   06/13/18 124/85   01/23/18 126/88   03/25/17 114/78    Weight from Last 3 Encounters:   06/13/18 148 lb (67.1 kg)   01/23/18 148 lb 3.2 oz (67.2 kg)   03/25/17 144 lb (65.3 kg)              Today, you had the following     No orders found for display         Today's Medication Changes          These changes are accurate as of 6/13/18  9:06 AM.  If you have any questions, ask your nurse or doctor.               Start taking these medicines.        Dose/Directions    cyclobenzaprine 10 MG tablet   Commonly known as:  FLEXERIL   Used for:  Acute " bilateral low back pain with right-sided sciatica   Started by:  Alma Duque APRN CNP        Dose:  5-10 mg   Take 0.5-1 tablets (5-10 mg) by mouth 3 times daily as needed for muscle spasms   Quantity:  30 tablet   Refills:  0       HYDROcodone-acetaminophen 5-325 MG per tablet   Commonly known as:  NORCO   Used for:  Acute bilateral low back pain with right-sided sciatica   Started by:  Alma Duque APRN CNP        Dose:  1 tablet   Take 1 tablet by mouth every 6 hours as needed for severe pain   Quantity:  10 tablet   Refills:  0       methylPREDNISolone 4 MG tablet   Commonly known as:  MEDROL DOSEPAK   Used for:  Acute bilateral low back pain with right-sided sciatica   Started by:  Alma Duque APRN CNP        Follow package instructions   Quantity:  21 tablet   Refills:  0            Where to get your medicines      These medications were sent to [facility] [address] [place], [place] - [address] AT [facility] [address], [facility] [address]     Phone:  [phone]     cyclobenzaprine 10 MG tablet    methylPREDNISolone 4 MG tablet         Some of these will need a paper prescription and others can be bought over the counter.  Ask your nurse if you have questions.     Bring a paper prescription for each of these medications     HYDROcodone-acetaminophen 5-325 MG per tablet               Information about OPIOIDS     PRESCRIPTION OPIOIDS: WHAT YOU NEED TO KNOW   We gave you an opioid (narcotic) pain medicine. It is important to manage your pain, but opioids are not always the best choice. You should first try all the other options your care team gave you. Take this medicine for as short a time (and as few doses) as possible.     These medicines have risks:    DO NOT drive when on new or higher doses of pain medicine. These medicines can affect your alertness and reaction times, and you could be arrested for driving under the influence (DUI). If you  need to use opioids long-term, talk to your care team about driving.    DO NOT operate heave machinery    DO NOT do any other dangerous activities while taking these medicines.     DO NOT drink any alcohol while taking these medicines.      If the opioid prescribed includes acetaminophen, DO NOT take with any other medicines that contain acetaminophen. Read all labels carefully. Look for the word  acetaminophen  or  Tylenol.  Ask your pharmacist if you have questions or are unsure.    You can get addicted to pain medicines, especially if you have a history of addiction (chemical, alcohol or substance dependence). Talk to your care team about ways to reduce this risk.    Store your pills in a secure place, locked if possible. We will not replace any lost or stolen medicine. If you don t finish your medicine, please throw away (dispose) as directed by your pharmacist. The [place] Pollution Control Agency has more information about safe disposal: https://www.pca.[facility].[place].us/living-green/managing-unwanted-medications.     All opioids tend to cause constipation. Drink plenty of water and eat foods that have a lot of fiber, such as fruits, vegetables, prune juice, apple juice and high-fiber cereal. Take a laxative (Miralax, milk of magnesia, Colace, Senna) if you don t move your bowels at least every other day.          Primary Care Provider Office Phone # Fax #    Shahab Pacheco [phone]       [address]  [facility] [address]        Equal Access to Services     [facility] Hospital[facility] : Hadii christine house hadasho Soomaali, waaxda luqadaha, qaybta kaalmada adeegyada, johanna nolan . So [facility] [phone].    ATENCIÓN: Si habla español, tiene a del castillo disposición servicios gratuitos de asistencia lingüística. Llame al [phone].    We comply with applicable federal civil rights laws and [place] laws. We do not discriminate on the basis of race, color, national origin, age, disability,  sex, sexual orientation, or gender identity.            Thank you!     Thank you for choosing [facility]  for your care. Our goal is always to provide you with excellent care. Hearing back from our patients is one way we can continue to improve our services. Please take a few minutes to complete the written survey that you may receive in the mail after your visit with us. Thank you!             Your Updated Medication List - Protect others around you: Learn how to safely use, store and throw away your medicines at www.disposemymeds.org.          This list is accurate as of 6/13/18  9:06 AM.  Always use your most recent med list.                   Brand Name Dispense Instructions for use Diagnosis    cyclobenzaprine 10 MG tablet    FLEXERIL    30 tablet    Take 0.5-1 tablets (5-10 mg) by mouth 3 times daily as needed for muscle spasms    Acute bilateral low back pain with right-sided sciatica       HYDROcodone-acetaminophen 5-325 MG per tablet    NORCO    10 tablet    Take 1 tablet by mouth every 6 hours as needed for severe pain    Acute bilateral low back pain with right-sided sciatica       IBUPROFEN IB OR      Take 2 tablets by mouth as needed.        methylPREDNISolone 4 MG tablet    MEDROL DOSEPAK    21 tablet    Follow package instructions    Acute bilateral low back pain with right-sided sciatica

## 2018-06-13 NOTE — PROGRESS NOTES
SUBJECTIVE:   Stacy Ferrer is a 43 year old male who presents to clinic today for the following health issues:      Back Pain       Duration: 1 day        Specific cause: lifting    Description:   Location of pain: low back bilateral  Character of pain: constant  Pain radiation:radiates into the right buttocks  New numbness or weakness in legs, not attributed to pain:  no     Intensity: Currently 9/10    History:   Pain interferes with job: YES  History of back problems: no prior back problems  Any previous MRI or X-rays: None  Sees a specialist for back pain:  No  Therapies tried without relief: cold, heat and massage    Alleviating factors:   Improved by: none      Precipitating factors:  Worsened by: Bending and Standing    Functional and Psychosocial Screen (itzbig STarT Back):      Not performed today          Accompanying Signs & Symptoms:  Risk of Fracture:  None  Risk of Cauda Equina:  None  Risk of Infection:  None  Risk of Cancer:  None  Risk of Ankylosing Spondylitis:  Onset at age <35, male, AND morning back stiffness. carolyn Kumar is a 43 year old male being seen today for back pain. He was helping his mother move a couch yesterday and heard a pop and when he stood up straight he was having right low back pain. He was unable to sleep all night due to the pain and it is now radiating down his outer right leg. He also reports his 4th and 5th toes have gone numb. He denies any saddle anesthesia, loss of bowel or bladder. He has tried ibuprofen, hot oil, bengay, ice and it did not improve his pain. 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 It improves with sitting vs. Standing but is still constant.     Problem list and histories reviewed & adjusted, as indicated.  Additional history: as documented    Patient Active Problem List   Diagnosis     Viral hepatitis B chronic (H)     CARDIOVASCULAR SCREENING; LDL GOAL LESS  "THAN 160     Elevated blood pressure reading without diagnosis of hypertension     Past Surgical History:   Procedure Laterality Date     NO HISTORY OF SURGERY         Social History   Substance Use Topics     Smoking status: Never Smoker     Smokeless tobacco: Never Used     Alcohol use No     Family History   Problem Relation Age of Onset     Hypertension Mother      CEREBROVASCULAR DISEASE Mother      Asthma No family hx of      C.A.D. No family hx of      DIABETES No family hx of      Breast Cancer No family hx of      Cancer - colorectal No family hx of      Prostate Cancer No family hx of          Current Outpatient Prescriptions   Medication Sig Dispense Refill     cyclobenzaprine (FLEXERIL) 10 MG tablet Take 0.5-1 tablets (5-10 mg) by mouth 3 times daily as needed for muscle spasms 30 tablet 0     HYDROcodone-acetaminophen (NORCO) 5-325 MG per tablet Take 1 tablet by mouth every 6 hours as needed for severe pain 10 tablet 0     IBUPROFEN IB OR Take 2 tablets by mouth as needed.        methylPREDNISolone (MEDROL DOSEPAK) 4 MG tablet Follow package instructions 21 tablet 0     No Known Allergies  BP Readings from Last 3 Encounters:   06/13/18 124/85   01/23/18 126/88   03/25/17 114/78    Wt Readings from Last 3 Encounters:   06/13/18 148 lb (67.1 kg)   01/23/18 148 lb 3.2 oz (67.2 kg)   03/25/17 144 lb (65.3 kg)                  Labs reviewed in EPIC    Reviewed and updated as needed this visit by clinical staff  Tobacco  Allergies  Meds  Problems  Med Hx  Surg Hx  Fam Hx  Soc Hx        Reviewed and updated as needed this visit by Provider  Allergies  Meds  Problems         ROS:  Constitutional, HEENT, cardiovascular, pulmonary, gi and gu systems are negative, except as otherwise noted.    OBJECTIVE:     /85 (BP Location: Right arm, Patient Position: Chair, Cuff Size: Adult Regular)  Pulse 65  Temp 98.1  F (36.7  C) (Oral)  Resp 20  Ht 5' 7\" (1.702 m)  Wt 148 lb (67.1 kg)  SpO2 100%  BMI " 23.18 kg/m2  Body mass index is 23.18 kg/(m^2).  GENERAL: healthy, alert and no distress  RESP: lungs clear to auscultation - no rales, rhonchi or wheezes  CV: regular rate and rhythm, normal S1 S2, no S3 or S4, no murmur, click or rub, no peripheral edema and peripheral pulses strong  MS: no gross musculoskeletal defects noted, no edema  SKIN: no suspicious lesions or rashes  Comprehensive back pain exam:  Tenderness of just lateral to the midline on the right of lumbar spine, Range of motion not limited by pain, Lower extremity strength functional and equal on both sides, Lower extremity reflexes within normal limits bilaterally, Lower extremity sensation normal and equal on both sides and Straight leg positive on  right, indicating possible ipsilateral radiculopathy  PSYCH: mentation appears normal, affect normal/bright    Diagnostic Test Results:  none     ASSESSMENT/PLAN:     1. Acute bilateral low back pain with right-sided sciatica  Neuro intact. No reg flags. Pain is clearly interfering with daily activities and work. Stacy appears very uncomfortable and exam reveals tenderness to palpation. Probable muscle strain causing increased pain vs herniated disc. Start medrol dose sanjay for radicular pain and to improve numbness in toes. Flexeril and norco for pain control.     - methylPREDNISolone (MEDROL DOSEPAK) 4 MG tablet; Follow package instructions  Dispense: 21 tablet; Refill: 0  - cyclobenzaprine (FLEXERIL) 10 MG tablet; Take 0.5-1 tablets (5-10 mg) by mouth 3 times daily as needed for muscle spasms  Dispense: 30 tablet; Refill: 0  - HYDROcodone-acetaminophen (NORCO) 5-325 MG per tablet; Take 1 tablet by mouth every 6 hours as needed for severe pain  Dispense: 10 tablet; Refill: 0    See Patient Instructions  Ice 15 minutes 4-6 times daily for the next 2 days and then use ice or heat  Gentle stretching and movement  Start Medrol dose sanjay  Start cyclobenzaprine (Flexeril) 0.5 - 1 tablet every 8 hours as needed  for muscle pain/spasms. This is a muscle relaxer. No driving, operating machinery or drinking alcohol while taking.  Start acetaminophen/hydrocodone (Norco) 1 tab every 6 hours as needed for severe pain. No driving, operating machinery or drinking alcohol while taking.  Follow up in 1-2 weeks  Note written for work  If you develop loss of bowel or bladder, saddle anesthesia, or foot drop, go to emergency department.    The benefits, risks and potential side effects were discussed in detail.   Black box warnings discussed as relevant. All patient questions were answered. The patient was instructed to follow up immediately if any adverse reactions develop.   Patient verbalizes understanding and agrees with plan of care. Patient stable for discharge.    Kenya Jimenez RN DNP Student    I, Alma Duque, was present with the nurse practitioner student who participated in the service and in the documentation of the note.  I have verified the history and personally performed the physical exam and medical decision making.  I agree with the assessment and plan of care as documented in the note.      Items personally reviewed: vitals.      CR Acosta [facility]

## 2018-06-27 ENCOUNTER — OFFICE VISIT (OUTPATIENT)
Dept: FAMILY MEDICINE | Facility: CLINIC | Age: 43
End: 2018-06-27
Payer: COMMERCIAL

## 2018-06-27 VITALS
SYSTOLIC BLOOD PRESSURE: 132 MMHG | RESPIRATION RATE: 16 BRPM | BODY MASS INDEX: 23.39 KG/M2 | TEMPERATURE: 98.3 F | HEART RATE: 59 BPM | DIASTOLIC BLOOD PRESSURE: 86 MMHG | WEIGHT: 149 LBS | OXYGEN SATURATION: 99 % | HEIGHT: 67 IN

## 2018-06-27 DIAGNOSIS — M54.41 ACUTE RIGHT-SIDED LOW BACK PAIN WITH RIGHT-SIDED SCIATICA: Primary | ICD-10-CM

## 2018-06-27 DIAGNOSIS — Z02.89 ENCOUNTER FOR COMPLETION OF FORM WITH PATIENT: ICD-10-CM

## 2018-06-27 PROCEDURE — 99213 OFFICE O/P EST LOW 20 MIN: CPT | Performed by: NURSE PRACTITIONER

## 2018-06-27 RX ORDER — CYCLOBENZAPRINE HCL 5 MG
5 TABLET ORAL 3 TIMES DAILY PRN
Qty: 30 TABLET | Refills: 0 | Status: SHIPPED | OUTPATIENT
Start: 2018-06-27 | End: 2018-07-16

## 2018-06-27 RX ORDER — NAPROXEN 500 MG/1
500 TABLET ORAL 2 TIMES DAILY PRN
Qty: 20 TABLET | Refills: 0 | Status: SHIPPED | OUTPATIENT
Start: 2018-06-27 | End: 2018-07-16

## 2018-06-27 ASSESSMENT — PAIN SCALES - GENERAL: PAINLEVEL: MODERATE PAIN (5)

## 2018-06-27 NOTE — MR AVS SNAPSHOT
After Visit Summary   6/27/2018    Stacy Ferrer    MRN: [id number]           Patient Information     Date Of Birth          1975        Visit Information        Provider Department      6/27/2018 8:40 AM Alma Duque APRN CNP [facility]        Today's Diagnoses     Acute right-sided low back pain with right-sided sciatica    -  1      Care Instructions    Ice or heat 15 minutes 4-6 times daily  Gentle stretching  Movement is key for early relief of back pain  You can take flexeril 5 mg up to 3 times daily as needed for back pain/spasms. No driving, operating machinery, or drinking alcohol while taking.  Start naproxen 500 mg every 12 hours as needed for pain/inflammation. Take with food.  If worsening or not improving in 1 week, follow up with primary care provider, sooner if needed.  If you develop loss of bowel or bladder, saddle anesthesia, or foot drop, go to emergency department.      At [facility], we strive to deliver an exceptional experience to you, every time we see you.  If you receive a survey in the mail, please send us back your thoughts. We really do value your feedback.    Based on your medical history, these are the current health maintenance/preventive care services that you are due for (some may have been done at this visit.)  Health Maintenance Due   Topic Date Due     HIV SCREEN (SYSTEM ASSIGNED)  06/05/1992     LIPID SCREEN Q5 YR MALE (SYSTEM ASSIGNED)  06/05/2009         Suggested websites for health information:  Www.DefenCall.CoachUp : Up to date and easily searchable information on multiple topics.  Www.medlineplus.gov : medication info, interactive tutorials, watch real surgeries online  Www.familydoctor.org : good info from the Academy of Family Physicians  Www.cdc.gov : public health info, travel advisories, epidemics (H1N1)  Www.aap.org : children's health info, normal development, vaccinations  Www.health.[facility].[place].us : [place] dept  of health, public health issues in [place], N1N1    Your care team:                            Family Medicine Internal Medicine   MD David San MD Shantel Branch-Fleming, MD Katya Georgiev PA-C Nam Ho, MD Pediatrics   KEVIN Weems, MD Elysia Myles CNP, MD Deborah Mielke, MD Kim Thein, APRN CNP      Clinic hours: Monday - Thursday 7 am-7 pm; Fridays 7 am-5 pm.   Urgent care: Monday - Friday 11 am-9 pm; Saturday and Sunday 9 am-5 pm.  Pharmacy : Monday -Thursday 8 am-8 pm; Friday 8 am-6 pm; Saturday and Sunday 9 am-5 pm.     Clinic: [phone]   Pharmacy: [phone]    ?au L?ng [Back Pain: Acute Or Chronic]  Ch? ng ?au l?ng th? ?ng gây nên do b?p th?t ho?c dây ch?ng c?a c?t s?ng b? ch? n th??ng . ?ôi lúc nh ? ng ? ?a tách r?i t? ng ? ? t x??ng cassidy c ?t s?ng có th? nhô ra và gây ?au b?ng cách chèn ép lên dây th?n kinh g? n ?ó . Ch? ng ?au l?ng c ?ng có th? hayes?t hi?n judith m?t l?c v?n/cúi hayes?ng ? ?t ng?t ( nh? cassidy m ?t milagro n? n xe h?i ), judith m?t c? ? ?ng v?ng v? và ??n gi ?n, ho?c judith khi nâng m?t v?t gì n?ng v? i t? th ? không h?p cách c? a c? th ?. Cassidy m?t ho?c daniela tr? ?ng h?p này, th? ?ng có s? co th?t c?a b?p th?t làm cho ?au thêm .    Ch? ng ?au l?ng c ? p tính th? ? ng ? ? h? n cassidy vòng t? m? t ? ?n daniela tu?n. Ch? ng ?au l?ng liên rachel ? ?n b?nh ? ?a, viêm kh?p c?t s?ng ho?c h?p t?y s?ng (h? p ?? ?ng d?n c?a c?t s?ng) có th? tr? nên mãn tính và kéo dài lennox?u tháng ho?c lennox? u n?m .  Tr? khi quý v? ? ã b? t? n th??ng v ? th? ch?t (ch?ng h?n b? milagro n? n xe h?i ho ?c té ngã) còn không thì ch?p sanford bibiana? n th? ? ng không ?? ?c ch? ? ? nh ? ? th?m ? ?nh ch? ng ?au l?ng vào lúc ban ? ?u. N? u c?n ?au ti ?p t? c và không ?áp ?ng v?i s? ?i ?u tr? y t? thì có th? cho ch?p sanford bibiana?n và làm các xét rashmi?m khác v? judith.  Ch?m Sóc T?i Iraida:  1. Quý v? có th? ph?i c?n n?m ngh? trên gi? ?ng cassidy nh? ng ngày ? ?u. Nh?ng ,  càng s?m càng t?t, quý v? nên b? t ? ?u ng?i d?y ho? c ?i ? ? tránh nh?ng v? n ? ? do n?m ngh? trên gi? ?ng kéo dài (y?u b?p th?t, làm cho c? ng l?ng và ? au t? thêm, máu ? óng c?c cassidy c?ng chân).  2. Khi n? m trên g? ?ng, ráng ki?m m? t t? th ? tho?i mái. N?m c?ng là t?t nh?t. Ráng n?m th? ng l?ng, kê g ? i d? ? i ? ?u g?i c?a quý v?. Quý v? c?ng có th? th? n? m nghiêng, ? ?u g?i g?p l?i v? phía ng?c và kê g?i gi? a daniela ? ?u g?i c?a quý v?.  3. Tránh ng?i lâu. ?i ?u này làm t?ng thêm s ?c ép lên ph? n l?ng d? ? i h?n là ? ?ng ho? c ?i .  4. Cassidy vòng daniela ngày ? ?u judith khi b? ch? n th??ng , ch? ?m TÚI N? ? C ?Á lên vùng b? ch? n th??ng cassidy v òng 20 phút m?i 2-4 gi?. ?i ?u này làm gi? m s?ng và ?au . ALEXANDRA?T (vòi sen, b?n t?m nóng ho?c mi?ng lót ? ?m nóng) t?t cho b?p th?t b? co th?t. Quý v? có th? b? t ? ?u b? ng n? ? c ?á , ti? p ?ó trevon ? n sang h?i nóng judith daniela ngày . M?t s? b?nh nhân c?m th? y ? ? h?n khi luân phiên c h?a tr? b? ng n? ? c ?á và h ? i nóng. Dùng m? t ph? ?ng pháp nào mà quý v? c?m th?y t?t cho quý v?.  5. Quý v? có th? dùng acetaminophen (Tylenol) ho? c ibuprofen (Motrin, Advil) ? ? ki? m soát c?n ?au, tr ? khi ? ã ?? ?c cho dùng m?t lo?i thu?c ch? ng ?au khác. [L?U Ý: N?u quý v? b? b?nh leah ho?c th?n mãn tính ho?c t?ng b? loét meng t? (stomach ulcer) ho?c hayes?t jeff? t ?? ?ng tiêu hóa (GI bleeding), hãy bàn v?i bác s? c?a quý v? tr? ?c khi dùng các lo?i thu?c này.]  6. Nên bi? t các ph??ng pháp nâng v ?t m? t cách an toàn và ? ?ng nâng v?t nào trên 15 pounds cho ? ?n khi h?t ?au .  Ti?p T?c Almas Dõi  v?i bác s? c?a quý v? ho?c v? i c? s ? này nh? ? ã h ? ?ng d?n ho?c n?u các tri?u ch?ng c?a quý v? không b? t ? ? u ? ? h?n judith m?t tu?n l?. V?t lý tr? li?u có th? c?n ? ?n.  [L?U Ý: N? u ? ã ch?p sanford bibiana?n, bác s? chuyên jose sanford bibiana?n s? xem l?i vi?c này. Quý v? s? ?? ?c thông báo v? b?t k? khám phá m?i nào có th? ? nh h? ? ng ? ?n vi? c ch?m sóc c ?a quý v?.]  N?u x?y ra b?t c? ?i?u nào judith  ?ây hãy L?P T?C ?I?U TR? Y T?:  -- C?n ?au tr ? nên t? h ?n ho ?c cherrie hayes?ng c?ng chân quý v?  -- Y?u ho?c tê m?t ho?c c? daniela c?ng chân  -- Không ki?m ch? ?? ? c ? ?i ti?n ho?c ti?u ti?n  -- Tê ? háng ho?c ? vùng b? ph?n sinh d?c  Date Last Reviewed: 11/12/2013 2000-2017 [employer]. [address] [place] [address], [place] [address]. All rights reserved. This information is not intended as a substitute for professional medical care. Always follow your healthcare professional's instructions.        Gi?m ?au l?ng  ?au l?ng là m?t v?n ?? th??ng g?p. Quý v? có th? làm c?ng c? l?ng do bê ?? quá n?ng ho?c di trevon?n miracle cách. S? c?ng c? l?ng có th? gây c?m giác khó ch?u, th?m chí là ?au. Có th? ph?i m?t vài tu?n m?i ph?c h?i ???c. ?? t? giúp b?n thân c?m th?y t?t h?n và ng?n ng?a s? c?ng c? l?ng v? gely, hãy th? các m?o gely:  L?u ý rachel tr?ng: Không ???c ??a aspirin cho tr? em ho?c tr? v? thành niên.        ? Ch??m ?á  ?á làm gi?m s? c?ng c? l?ng và s?ng phù. ?á có tác d?ng lennox?u nh?t n?u dùng cassidy [facility] 24-48 gi? gely khi b? th??ng.    B?c meng ho?c túi ?á v?n cassidy m?t t?m v?i cervantes ??a. (Không ???c ch??m ?á tr?c ti?p lên da c?a quý v?.)    ??t ?á lên vùng l?ng b? ?au lennox?u nh?t c?a quý v?.    Không ???c ch??m ?á lâu quá 20 phút m?i l?n.    Quý v? nên s? d?ng ?á và l?n m?i ngày.  ? Dùng thu?c  Thu?c gi?m ?au bán rufino qu?y g?m có aspirin, acetaminophen và ibuprofen. Chúng có th? giúp làm gi?m c?m giác khó ch?u. M?t s? thu?c c?ng gi?m s?ng phù.    Hãy nói cho bác s? c?a quý v? v? các lo?i thu?c mà quý v? ?ã dùng.    Ch? ???c u?ng thu?c jamee ch? d?n.  ? Ch??m lennox?t  Gely 48 gi? ??u tiên, lennox?t có th? làm giãn c? và c?i thi?n tu?n hoàn máu.    Th? t?m b?ng b?n ho?c vòi alia sen n??c ?m. Ho?c s? d?ng ??m s??i ?m ??t ? ch? ?? th?p. ?? tránh b? b?ng, hãy ?? m?t t?m v?i cách gi?a quý v? và ??m s??i.    Không ???c s? d?ng ??m s??i lâu quá 15 phút m?i l?n. Không ???c ng? trên ??m s??i.  Date Last Reviewed: 9/1/2015 2000-2017  "[employer]. [address] [place] [address], [place] [address]. All rights reserved. This information is not intended as a substitute for professional medical care. Always follow your healthcare professional's instructions.                Follow-ups after your visit        Who to contact     If you have questions or need follow up information about today's clinic visit or your schedule please contact [facility] directly at [phone].  Normal or non-critical lab and imaging results will be communicated to you by MyChart, letter or phone within 4 business days after the clinic has received the results. If you do not hear from us within 7 days, please contact the clinic through [employer] or phone. If you have a critical or abnormal lab result, we will notify you by phone as soon as possible.  Submit refill requests through [employer] or call your pharmacy and they will forward the refill request to us. Please allow 3 business days for your refill to be completed.          Additional Information About Your Visit        Care EveryWhere ID     This is your Care EveryWhere ID. This could be used by other organizations to access your [facility] medical records  [id number]        Your Vitals Were     Pulse Temperature Respirations Height Pulse Oximetry BMI (Body Mass Index)    59 98.3  F (36.8  C) (Oral) 16 5' 7\" (1.702 m) 99% 23.34 kg/m2       Blood Pressure from Last 3 Encounters:   06/27/18 132/86   06/13/18 124/85   01/23/18 126/88    Weight from Last 3 Encounters:   06/27/18 149 lb (67.6 kg)   06/13/18 148 lb (67.1 kg)   01/23/18 148 lb 3.2 oz (67.2 kg)              Today, you had the following     No orders found for display         Today's Medication Changes          These changes are accurate as of 6/27/18  9:09 AM.  If you have any questions, ask your nurse or doctor.               Start taking these medicines.        Dose/Directions    naproxen 500 MG tablet   Commonly known as:  NAPROSYN "   Used for:  Acute right-sided low back pain with right-sided sciatica   Started by:  Alma Duque APRN CNP        Dose:  500 mg   Take 1 tablet (500 mg) by mouth 2 times daily as needed for moderate pain   Quantity:  20 tablet   Refills:  0         These medicines have changed or have updated prescriptions.        Dose/Directions    cyclobenzaprine 5 MG tablet   Commonly known as:  FLEXERIL   This may have changed:    - medication strength  - how much to take   Used for:  Acute right-sided low back pain with right-sided sciatica   Changed by:  Alma Duque APRN CNP        Dose:  5 mg   Take 1 tablet (5 mg) by mouth 3 times daily as needed for muscle spasms   Quantity:  30 tablet   Refills:  0         Stop taking these medicines if you haven't already. Please contact your care team if you have questions.     HYDROcodone-acetaminophen 5-325 MG per tablet   Commonly known as:  NORCO   Stopped by:  Alma Duque APRN CNP           IBUPROFEN IB OR   Stopped by:  Alma Duque APRN CNP           methylPREDNISolone 4 MG tablet   Commonly known as:  MEDROL DOSEPAK   Stopped by:  Alma Duque APRN CNP                Where to get your medicines      These medications were sent to [id number] - [place], [place] - [address] AT [facility] [address] [place] [address]     Phone:  [phone]     cyclobenzaprine 5 MG tablet    naproxen 500 MG tablet                Primary Care Provider Office Phone # Fax #    Shahab Pacheco [phone] [address]  [place] [address]        Equal Access to Services     [facility]: Hadii aad ku hadasho Soomaali, waaxda luqadaha, qaybta kaalmada adeegyada, johanna guillremo. So [facility] [phone].    ATENCIÓN: Si habla español, tiene a del castillo disposición servicios gratuitos de asistencia lingüística. Llame al [phone].    We comply with applicable [facility] civil  rights laws and [place] laws. We do not discriminate on the basis of race, color, national origin, age, disability, sex, sexual orientation, or gender identity.            Thank you!     Thank you for choosing [facility]  for your care. Our goal is always to provide you with excellent care. Hearing back from our patients is one way we can continue to improve our services. Please take a few minutes to complete the written survey that you may receive in the mail after your visit with us. Thank you!             Your Updated Medication List - Protect others around you: Learn how to safely use, store and throw away your medicines at www.disposemymeds.org.          This list is accurate as of 6/27/18  9:09 AM.  Always use your most recent med list.                   Brand Name Dispense Instructions for use Diagnosis    cyclobenzaprine 5 MG tablet    FLEXERIL    30 tablet    Take 1 tablet (5 mg) by mouth 3 times daily as needed for muscle spasms    Acute right-sided low back pain with right-sided sciatica       naproxen 500 MG tablet    NAPROSYN    20 tablet    Take 1 tablet (500 mg) by mouth 2 times daily as needed for moderate pain    Acute right-sided low back pain with right-sided sciatica

## 2018-06-27 NOTE — PROGRESS NOTES
SUBJECTIVE:   Stacy Ferrer is a 43 year old male who presents to clinic today for the following health issues:      Back Pain       Duration: 1-2 weeks        Specific cause: lifting    Description:   Location of pain: low back right  Character of pain: sharp  Pain radiation:radiates into the right leg  New numbness or weakness in legs, not attributed to pain:  no     Intensity: Currently 5/10    History:   Pain interferes with job: YES  History of back problems: no prior back problems  Any previous MRI or X-rays: None  Sees a specialist for back pain:  No  Therapies tried without relief:     Alleviating factors:   Improved by: flexeril and norco      Precipitating factors:  Worsened by: Walking    Functional and Psychosocial Screen (Antonella STarT Back):      Not performed today          Accompanying Signs & Symptoms:  Risk of Fracture:  None  Risk of Cauda Equina:  None  Risk of Infection:  None  Risk of Cancer:  None  Risk of Ankylosing Spondylitis:  Onset at age <35, male, AND morning back stiffness. no     43 year old male presents for follow up of LBP that started about 2 weeks ago when he was moving furniture. See my note from 6/13/18. He completed the Medrol dose sanjay. He has been taking the Flexeril 10 mg but it makes him sleepy. Took some Acetaminophen/hydrocodone (Norco). He's feeling much better but pain still present. Pain 5/10 with movement. No pain with sitting. Has some sciatica with movement. Right 4th toe sometimes numb. No loss of bowel or bladder. No saddle anesthesia. Overall feeling much better. Brings in short term disability paperwork. He is not able to return to work if he has restrictions.       Problem list and histories reviewed & adjusted, as indicated.  Additional history: as documented    Patient Active Problem List   Diagnosis     Viral hepatitis B chronic (H)     CARDIOVASCULAR SCREENING; LDL GOAL LESS THAN 160     Elevated blood pressure reading without diagnosis of hypertension      "Past Surgical History:   Procedure Laterality Date     NO HISTORY OF SURGERY         Social History   Substance Use Topics     Smoking status: Never Smoker     Smokeless tobacco: Never Used     Alcohol use No     Family History   Problem Relation Age of Onset     Hypertension Mother      Cerebrovascular Disease Mother      Asthma No family hx of      C.A.D. No family hx of      Diabetes No family hx of      Breast Cancer No family hx of      Cancer - colorectal No family hx of      Prostate Cancer No family hx of          Current Outpatient Prescriptions   Medication Sig Dispense Refill     cyclobenzaprine (FLEXERIL) 10 MG tablet Take 0.5-1 tablets (5-10 mg) by mouth 3 times daily as needed for muscle spasms 30 tablet 0     HYDROcodone-acetaminophen (NORCO) 5-325 MG per tablet Take 1 tablet by mouth every 6 hours as needed for severe pain 10 tablet 0     IBUPROFEN IB OR Take 2 tablets by mouth as needed.        methylPREDNISolone (MEDROL DOSEPAK) 4 MG tablet Follow package instructions 21 tablet 0     No Known Allergies    Reviewed and updated as needed this visit by clinical staff  Tobacco  Allergies  Meds  Problems  Med Hx  Surg Hx  Fam Hx  Soc Hx        Reviewed and updated as needed this visit by Provider  Allergies  Meds  Problems         ROS:  Constitutional, HEENT, cardiovascular, pulmonary, GI, , musculoskeletal, neuro, skin, endocrine and psych systems are negative, except as otherwise noted.    OBJECTIVE:     /86 (BP Location: Left arm, Patient Position: Chair, Cuff Size: Adult Regular)  Pulse 59  Temp 98.3  F (36.8  C) (Oral)  Resp 16  Ht 5' 7\" (1.702 m)  Wt 149 lb (67.6 kg)  SpO2 99%  BMI 23.34 kg/m2  Body mass index is 23.34 kg/(m^2).  GENERAL: healthy, alert and no distress  NECK: no adenopathy, no asymmetry, masses, or scars and thyroid normal to palpation  RESP: lungs clear to auscultation - no rales, rhonchi or wheezes  CV: regular rate and rhythm, normal S1 S2, no S3 or S4, " no murmur, click or rub, no peripheral edema and peripheral pulses strong  MS: no gross musculoskeletal defects noted, no edema  SKIN: no suspicious lesions or rashes  NEURO: Normal strength and tone, mentation intact and speech normal  Comprehensive back pain exam:  Tenderness of right low back/SI, Pain limits the following motions: moves slowly due to right low back pain, Lower extremity strength functional and equal on both sides, Lower extremity reflexes within normal limits bilaterally, Lower extremity sensation normal and equal on both sides and Straight leg raise negative bilaterally  PSYCH: mentation appears normal, affect normal/bright    Diagnostic Test Results:  none     ASSESSMENT/PLAN:     1. Acute right-sided low back pain with right-sided sciatica  Improving, but still symptomatic. Will decrease flexeril to 5 mg. Will add naproxen. Short term disability paperwork filled out for him as he's not able to return to work if he has restriction. See workability form.  - cyclobenzaprine (FLEXERIL) 5 MG tablet; Take 1 tablet (5 mg) by mouth 3 times daily as needed for muscle spasms  Dispense: 30 tablet; Refill: 0  - naproxen (NAPROSYN) 500 MG tablet; Take 1 tablet (500 mg) by mouth 2 times daily as needed for moderate pain  Dispense: 20 tablet; Refill: 0  Ice or heat 15 minutes 4-6 times daily  Gentle stretching  Movement is key for early relief of back pain  You can take flexeril 5 mg up to 3 times daily as needed for back pain/spasms. No driving, operating machinery, or drinking alcohol while taking.  Start naproxen 500 mg every 12 hours as needed for pain/inflammation. Take with food.  If worsening or not improving in 1 week, follow up with primary care provider, sooner if needed.  If you develop loss of bowel or bladder, saddle anesthesia, or foot drop, go to emergency department.    See Patient Instructions    The benefits, risks and potential side effects were discussed in detail. Black box warnings  discussed as relevant. All patient questions were answered. The patient was instructed to follow up immediately if any adverse reactions develop.    Patient verbalizes understanding and agrees with plan of care. Patient stable for discharge.      CR Acosta [facility]

## 2018-06-27 NOTE — PATIENT INSTRUCTIONS
Ice or heat 15 minutes 4-6 times daily  Gentle stretching  Movement is key for early relief of back pain  You can take flexeril 5 mg up to 3 times daily as needed for back pain/spasms. No driving, operating machinery, or drinking alcohol while taking.  Start naproxen 500 mg every 12 hours as needed for pain/inflammation. Take with food.  If worsening or not improving in 1 week, follow up with primary care provider, sooner if needed.  If you develop loss of bowel or bladder, saddle anesthesia, or foot drop, go to emergency department.      At [facility], we strive to deliver an exceptional experience to you, every time we see you.  If you receive a survey in the mail, please send us back your thoughts. We really do value your feedback.    Based on your medical history, these are the current health maintenance/preventive care services that you are due for (some may have been done at this visit.)  Health Maintenance Due   Topic Date Due     HIV SCREEN (SYSTEM ASSIGNED)  06/05/1992     LIPID SCREEN Q5 YR MALE (SYSTEM ASSIGNED)  06/05/2009         Suggested websites for health information:  Www.[facility].org : Up to date and easily searchable information on multiple topics.  Www.medlineplus.gov : medication info, interactive tutorials, watch real surgeries online  Www.familydoctor.org : good info from the Academy of Family Physicians  Www.cdc.gov : public health info, travel advisories, epidemics (H1N1)  Www.aap.org : children's health info, normal development, vaccinations  Www.health.[facility].[place].us : [place] dept of health, public health issues in [place], N1N1    Your care team:                            Family Medicine Internal Medicine   MD David San MD Shantel Branch-Fleming, MD Katya Georgiev PA-C Nam Ho, MD Pediatrics   KEVIN Weems, CNP Rizwana Toussaint APRN CNP   MD Elysia Woodall MD Deborah Mielke, MD Kim Thein, APRN CNP       Clinic hours: Monday - Thursday 7 am-7 pm; Fridays 7 am-5 pm.   Urgent care: Monday - Friday 11 am-9 pm; Saturday and Sunday 9 am-5 pm.  Pharmacy : Monday -Thursday 8 am-8 pm; Friday 8 am-6 pm; Saturday and Sunday 9 am-5 pm.     Clinic: [phone]   Pharmacy: [phone]    ?au L?ng [Back Pain: Acute Or Chronic]  Ch? ng ?au l?ng th? ?ng gây nên do b?p th?t ho?c dây ch?ng c?a c?t s?ng b? ch? n th??ng . ?ôi lúc nh ? ng ? ?a tách r?i t? ng ? ? t x??ng cassidy c ?t s?ng có th? nhô ra và gây ?au b?ng cách chèn ép lên dây th?n kinh g? n ?ó . Ch? ng ?au l?ng c ?ng có th? hayes?t hi?n judith m?t l?c v?n/cúi hayes?ng ? ?t ng?t ( nh? cassidy m ?t milagro n? n xe h?i ), judith m?t c? ? ?ng v?ng v? và ??n gi ?n, ho?c judith khi nâng m?t v?t gì n?ng v? i t? th ? không h?p cách c? a c? th ?. Cassidy m?t ho?c daniela tr? ?ng h?p này, th? ?ng có s? co th?t c?a b?p th?t làm cho ?au thêm .    Ch? ng ?au l?ng c ? p tính th? ? ng ? ? h? n cassidy vòng t? m? t ? ?n daniela tu?n. Ch? ng ?au l?ng liên rachel ? ?n b?nh ? ?a, viêm kh?p c?t s?ng ho?c h?p t?y s?ng (h? p ?? ?ng d?n c?a c?t s?ng) có th? tr? nên mãn tính và kéo dài lennox?u tháng ho?c lennox? u n?m .  Tr? khi quý v? ? ã b? t? n th??ng v ? th? ch?t (ch?ng h?n b? milagro n? n xe h?i ho ?c té ngã) còn không thì ch?p sanford bibiana? n th? ? ng không ?? ?c ch? ? ? nh ? ? th?m ? ?nh ch? ng ?au l?ng vào lúc ban ? ?u. N? u c?n ?au ti ?p t? c và không ?áp ?ng v?i s? ?i ?u tr? y t? thì có th? cho ch?p sanford bibiana?n và làm các xét rashmi?m khác v? judith.  Ch?m Sóc T?i Iraida:  1. Quý v? có th? ph?i c?n n?m ngh? trên gi? ?ng cassidy nh? ng ngày ? ?u. Nh?ng , càng s?m càng t?t, quý v? nên b? t ? ?u ng?i d?y ho? c ?i ? ? tránh nh?ng v? n ? ? do n?m ngh? trên gi? ?ng kéo dài (y?u b?p th?t, làm cho c? ng l?ng và ? au t? thêm, máu ? óng c?c cassidy c?ng chân).  2. Khi n? m trên g? ?ng, ráng ki?m m? t t? th ? tho?i mái. N?m c?ng là t?t nh?t. Ráng n?m th? ng l?ng, kê g ? i d? ? i ? ?u g?i c?a quý v?. Quý v? c?ng có th? th? n? m nghiêng, ? ?u g?i g?p  l?i v? phía ng?c và kê g?i gi? a daniela ? ?u g?i c?a quý v?.  3. Tránh ng?i lâu. ?i ?u này làm t?ng thêm s ?c ép lên ph? n l?ng d? ? i h?n là ? ?ng ho? c ?i .  4. Cassidy vòng daniela ngày ? ?u judith khi b? ch? n th??ng , ch? ?m TÚI N? ? C ?Á lên vùng b? ch? n th??ng cassidy v òng 20 phút m?i 2-4 gi?. ?i ?u này làm gi? m s?ng và ?au . ALEXANDRA?T (vòi sen, b?n t?m nóng ho?c mi?ng lót ? ?m nóng) t?t cho b?p th?t b? co th?t. Quý v? có th? b? t ? ?u b? ng n? ? c ?á , ti? p ?ó trevon ? n sang h?i nóng judith daniela ngày . M?t s? b?nh nhân c?m th? y ? ? h?n khi luân phiên c h?a tr? b? ng n? ? c ?á và h ? i nóng. Dùng m? t ph? ?ng pháp nào mà quý v? c?m th?y t?t cho quý v?.  5. Quý v? có th? dùng acetaminophen (Tylenol) ho? c ibuprofen (Motrin, Advil) ? ? ki? m soát c?n ?au, tr ? khi ? ã ?? ?c cho dùng m?t lo?i thu?c ch? ng ?au khác. [L?U Ý: N?u quý v? b? b?nh leah ho?c th?n mãn tính ho?c t?ng b? loét meng t? (stomach ulcer) ho?c hayes?t jeff? t ?? ?ng tiêu hóa (GI bleeding), hãy bàn v?i bác s? c?a quý v? tr? ?c khi dùng các lo?i thu?c này.]  6. Nên bi? t các ph??ng pháp nâng v ?t m? t cách an toàn và ? ?ng nâng v?t nào trên 15 pounds cho ? ?n khi h?t ?au .  Ti?p T?c Almas Dõi  v?i bác s? c?a quý v? ho?c v? i c? s ? này nh? ? ã h ? ?ng d?n ho?c n?u các tri?u ch?ng c?a quý v? không b? t ? ? u ? ? h?n judith m?t tu?n l?. V?t lý tr? li?u có th? c?n ? ?n.  [L?U Ý: N? u ? ã ch?p sanford bibiana?n, bác s? chuyên jose sanford bibiana?n s? xem l?i vi?c này. Quý v? s? ?? ?c thông báo v? b?t k? khám phá m?i nào có th? ? nh h? ? ng ? ?n vi? c ch?m sóc c ?a quý v?.]  N?u x?y ra b?t c? ?i?u nào judith ?ây hãy L?P T?C ?I?U TR? Y T?:  -- C?n ?au tr ? nên t? h ?n ho ?c cherrie hayes?ng c?ng latoya quý v?  -- Y?u ho?c tê m?t ho?c c? daniela c?ng chân  -- Không ki?m ch? ?? ? c ? ?i ti?n ho?c ti?u ti?n  -- Tê ? háng ho?c ? vùng b? ph?n sinh d?c  Date Last Reviewed: 11/12/2013 2000-2017 [employer]. [address] [place] [address], [place] [address]. All rights reserved. This information is not  intended as a substitute for professional medical care. Always follow your healthcare professional's instructions.        Gi?m ?au l?ng  ?au l?ng là m?t v?n ?? th??ng g?p. Quý v? có th? làm c?ng c? l?ng do bê ?? quá n?ng ho?c di trevon?n miracle cách. S? c?ng c? l?ng có th? gây c?m giác khó ch?u, th?m chí là ?au. Có th? ph?i m?t vài tu?n m?i ph?c h?i ???c. ?? t? giúp b?n thân c?m th?y t?t h?n và ng?n ng?a s? c?ng c? l?ng v? gely, hãy th? các m?o gely:  L?u ý rachel tr?ng: Không ???c ??a aspirin cho tr? em ho?c tr? v? thành niên.        ? Ch??m ?á  ?á làm gi?m s? c?ng c? l?ng và s?ng phù. ?á có tác d?ng lennox?u nh?t n?u dùng cassidy vòng 24-48 gi? gely khi b? th??ng.    B?c meng ho?c túi ?á v?n cassidy m?t t?m v?i cervantes ??a. (Không ???c ch??m ?á tr?c ti?p lên da c?a quý v?.)    ??t ?á lên vùng l?ng b? ?au lennox?u nh?t c?a quý v?.    Không ???c ch??m ?á lâu quá 20 phút m?i l?n.    Quý v? nên s? d?ng ?á và l?n m?i ngày.  ? Dùng thu?c  Thu?c gi?m ?au bán rufino qu?y g?m có aspirin, acetaminophen và ibuprofen. Chúng có th? giúp làm gi?m c?m giác khó ch?u. M?t s? thu?c c?ng gi?m s?ng phù.    Hãy nói cho bác s? c?a quý v? v? các lo?i thu?c mà quý v? ?ã dùng.    Ch? ???c u?ng thu?c jamee ch? d?n.  ? Ch??m lennox?t  Gely 48 gi? ??u tiên, lennox?t có th? làm giãn c? và c?i thi?n tu?n hoàn máu.    Th? t?m b?ng b?n ho?c vòi alia sen n??c ?m. Ho?c s? d?ng ??m s??i ?m ??t ? ch? ?? th?p. ?? tránh b? b?ng, hãy ?? m?t t?m v?i cách gi?a quý v? và ??m s??i.    Không ???c s? d?ng ??m s??i lâu quá 15 phút m?i l?n. Không ???c ng? trên ??m s??i.  Date Last Reviewed: 9/1/2015 2000-2017 [employer]. [address] [place] [address], [place] [address]. All rights reserved. This information is not intended as a substitute for professional medical care. Always follow your healthcare professional's instructions.

## 2018-06-28 ENCOUNTER — TELEPHONE (OUTPATIENT)
Dept: FAMILY MEDICINE | Facility: CLINIC | Age: 43
End: 2018-06-28

## 2018-06-28 NOTE — LETTER
July 3, 2018      Stacy Ferrer  [address]  [place] [facility] [address]        To Whom It May Concern:    Stacy Ferrer  was seen on 6/13/18 and 6/27/18.  He has a follow up appointment on 7/6/18.      Sincerely,        CR Acosta CNP

## 2018-06-28 NOTE — TELEPHONE ENCOUNTER
Forms you sent back yesterday are missing a page  They refaxed to you   Can you make sure that gets done    Call patient if questions and ok to leave message  [phone]

## 2018-06-29 NOTE — TELEPHONE ENCOUNTER
This writer attempted to contact [facility] on 06/29/18      Reason for call informed to fax forms again. Never received  and left detailed message.      If patient calls back:   Patient contacted by 1st floor [facility] Team (MA/TC). Inform patient that someone from the team will contact them, document that pt called and route to care team.         López Friedman MA

## 2018-06-29 NOTE — TELEPHONE ENCOUNTER
Reason for Call:  Other     Detailed comments: Needs a note saying what days he was seen in clinic so he can get paid his short term money. Was told we have fax number can you just fax it. Please call if you have any questions.    Phone Number Patient can be reached at: Home number on file [phone] (home)    Best Time: any    Can we leave a detailed message on this number? YES    Call taken on 6/29/2018 at 3:30 PM by Tiffany Burt

## 2018-07-03 NOTE — TELEPHONE ENCOUNTER
Letter from Alma and last completed form from Eve is printed and faxed to [place] at fax # [phone].  Olivier Goldstein,  [facility]

## 2018-07-06 ENCOUNTER — TELEPHONE (OUTPATIENT)
Dept: FAMILY MEDICINE | Facility: CLINIC | Age: 43
End: 2018-07-06

## 2018-07-06 ENCOUNTER — OFFICE VISIT (OUTPATIENT)
Dept: FAMILY MEDICINE | Facility: CLINIC | Age: 43
End: 2018-07-06
Payer: COMMERCIAL

## 2018-07-06 VITALS
BODY MASS INDEX: 23.54 KG/M2 | HEIGHT: 67 IN | RESPIRATION RATE: 16 BRPM | DIASTOLIC BLOOD PRESSURE: 80 MMHG | WEIGHT: 150 LBS | TEMPERATURE: 97.5 F | HEART RATE: 76 BPM | SYSTOLIC BLOOD PRESSURE: 120 MMHG | OXYGEN SATURATION: 97 %

## 2018-07-06 DIAGNOSIS — M54.50 ACUTE RIGHT-SIDED LOW BACK PAIN WITHOUT SCIATICA: Primary | ICD-10-CM

## 2018-07-06 PROCEDURE — 99213 OFFICE O/P EST LOW 20 MIN: CPT | Performed by: NURSE PRACTITIONER

## 2018-07-06 ASSESSMENT — PAIN SCALES - GENERAL: PAINLEVEL: MODERATE PAIN (4)

## 2018-07-06 NOTE — TELEPHONE ENCOUNTER
Reason for Call:  Other     Detailed comments: patient states that every time he is seen for back pain, everything that is discuss during his appointment needs to be faxed. Things that need to be put in each time, is patient cannot lift more than 5 lbs because of back/hip pain. Also need each time patient is seen the treatment plan, and any other information pertaining to his condition needs to be faxed each visit.    Phone Number Patient can be reached at: Home number on file [phone] (home)    Best Time: any    Can we leave a detailed message on this number? YES    Call taken on 7/6/2018 at 4:38 PM by Aminta Garcia

## 2018-07-06 NOTE — MR AVS SNAPSHOT
After Visit Summary   7/6/2018    Stacy Ferrer    MRN: [id number]           Patient Information     Date Of Birth          1975        Visit Information        Provider Department      7/6/2018 8:40 AM Alma Duque APRN CNP [facility]        Today's Diagnoses     Acute right-sided low back pain without sciatica    -  1      Care Instructions    Ice or heat 15 minutes 4-6 times daily  Gentle stretching  Movement is key for early relief of back pain  You can take flexeril 5 mg up to 3 times daily as needed for back pain/spasms. No driving, operating machinery, or drinking alcohol while taking.  Start naproxen 500 mg every 12 hours as needed for pain/inflammation. Take with food.  If worsening or not improving in 1 week, follow up with primary care provider, sooner if needed.  If you develop loss of bowel or bladder, saddle anesthesia, or foot drop, go to emergency department.             Follow-ups after your visit        Who to contact     If you have questions or need follow up information about today's clinic visit or your schedule please contact [facility] directly at [phone].  Normal or non-critical lab and imaging results will be communicated to you by MyChart, letter or phone within 4 business days after the clinic has received the results. If you do not hear from us within 7 days, please contact the clinic through MyChart or phone. If you have a critical or abnormal lab result, we will notify you by phone as soon as possible.  Submit refill requests through [email or web address] or call your pharmacy and they will forward the refill request to us. Please allow 3 business days for your refill to be completed.          Additional Information About Your Visit        Care EveryWhere ID     This is your Care EveryWhere ID. This could be used by other organizations to access your [place] medical records  [id number]        Your Vitals Were     Pulse Temperature  "Respirations Height Pulse Oximetry BMI (Body Mass Index)    76 97.5  F (36.4  C) (Oral) 16 5' 7\" (1.702 m) 97% 23.49 kg/m2       Blood Pressure from Last 3 Encounters:   07/06/18 120/80   06/27/18 132/86   06/13/18 124/85    Weight from Last 3 Encounters:   07/06/18 150 lb (68 kg)   06/27/18 149 lb (67.6 kg)   06/13/18 148 lb (67.1 kg)              Today, you had the following     No orders found for display       Primary Care Provider Office Phone # Fax #    Shahab Pacheco [phone]       [address] [place]  [facility] [address]        Equal Access to Services     CHAGO YATES : Hadii aad ku hadasho Soomaali, waaxda luqadaha, qaybta kaalmada adeegyada, waxay linda haycarlito nolan . So [facility] [phone].    ATENCIÓN: Si habla español, tiene a del castillo disposición servicios gratuitos de asistencia lingüística. Llame al [phone].    We comply with applicable federal civil rights laws and [place] laws. We do not discriminate on the basis of race, color, national origin, age, disability, sex, sexual orientation, or gender identity.            Thank you!     Thank you for choosing [facility]  for your care. Our goal is always to provide you with excellent care. Hearing back from our patients is one way we can continue to improve our services. Please take a few minutes to complete the written survey that you may receive in the mail after your visit with us. Thank you!             Your Updated Medication List - Protect others around you: Learn how to safely use, store and throw away your medicines at www.disposemymeds.org.          This list is accurate as of 7/6/18  9:06 AM.  Always use your most recent med list.                   Brand Name Dispense Instructions for use Diagnosis    cyclobenzaprine 5 MG tablet    FLEXERIL    30 tablet    Take 1 tablet (5 mg) by mouth 3 times daily as needed for muscle spasms    Acute right-sided low back pain with right-sided sciatica       " naproxen 500 MG tablet    NAPROSYN    20 tablet    Take 1 tablet (500 mg) by mouth 2 times daily as needed for moderate pain    Acute right-sided low back pain with right-sided sciatica

## 2018-07-06 NOTE — LETTER
July 6, 2018      Stacy Ferrer  [address]  [place] [facility] [address]        To Whom It May Concern:    Stacy Ferrer  was seen again on 7/6/18 for an injury. He was seen previously for this injury on 6/13/18 and 6/27/18. If he returns to work, he must not lift more than 10 lb, no repetitive movements, no twisting or bending. If a job cannot be found to meet these parameters, he may not work until he has been re-evaluated in 1-2 weeks.      Sincerely,        CR Acosta CNP

## 2018-07-06 NOTE — TELEPHONE ENCOUNTER
Please call patient and get clarification about the information he's requesting. We discussed all of this in detail this morning during our visit. Does he just need his paperwork faxed??

## 2018-07-06 NOTE — TELEPHONE ENCOUNTER
..Reason for Call:  Patient information    Detailed comments: to discuss today's visit (7-06), progress, restrictions    and treatment plan    Phone Number Patient can be reached at: Other phone number:  [phone] [facility] [address]    Best Time: any    Can we leave a detailed message on this number? Not Applicable    Call taken on 7/6/2018 at 11:36 AM by Loida Camarena            Office is it today/restrictions and progress/treatment  Faxing info

## 2018-07-06 NOTE — PATIENT INSTRUCTIONS
Ice or heat 15 minutes 4-6 times daily  Gentle stretching  Movement is key for early relief of back pain  You can take flexeril 5 mg up to 3 times daily as needed for back pain/spasms. No driving, operating machinery, or drinking alcohol while taking.  Start naproxen 500 mg every 12 hours as needed for pain/inflammation. Take with food.  If worsening or not improving in 1 week, follow up with primary care provider, sooner if needed.  If you develop loss of bowel or bladder, saddle anesthesia, or foot drop, go to emergency department.       At [facility], we strive to deliver an exceptional experience to you, every time we see you.  If you receive a survey in the mail, please send us back your thoughts. We really do value your feedback.    Based on your medical history, these are the current health maintenance/preventive care services that you are due for (some may have been done at this visit.)  Health Maintenance Due   Topic Date Due     HIV SCREEN (SYSTEM ASSIGNED)  06/05/1992     LIPID SCREEN Q5 YR MALE (SYSTEM ASSIGNED)  06/05/2009         Suggested websites for health information:  Www.[facility].org : Up to date and easily searchable information on multiple topics.  Www.medlineplus.gov : medication info, interactive tutorials, watch real surgeries online  Www.familydoctor.org : good info from the Academy of Family Physicians  Www.cdc.gov : public health info, travel advisories, epidemics (H1N1)  Www.aap.org : children's health info, normal development, vaccinations  Www.health.[facility].[place].us : [place] dept of health, public health issues in [place], N1N1    Your care team:                            Family Medicine Internal Medicine   MD David San MD Shantel Branch-Fleming, MD Katya Georgiev PA-C Nam Ho, MD Pediatrics   KEVIN Weems, CNP Rizwana Toussaint APRN CNP   MD Elysia Woodall MD Deborah Mielke, MD Kim Thein, APRN CNP       Clinic hours: Monday - Thursday 7 am-7 pm; Fridays 7 am-5 pm.   Urgent care: Monday - Friday 11 am-9 pm; Saturday and Sunday 9 am-5 pm.  Pharmacy : Monday -Thursday 8 am-8 pm; Friday 8 am-6 pm; Saturday and Sunday 9 am-5 pm.     Clinic: [phone]   Pharmacy: [phone]

## 2018-07-06 NOTE — PROGRESS NOTES
SUBJECTIVE:   Stacy Ferrer is a 43 year old male who presents to clinic today for the following health issues:      Back Pain Follow Up      Duration: ongoing        Specific cause: lifting    Description:   Location of pain: low back right  Character of pain: dull ache and stabbing  Pain radiation:radiates into the right buttocks  New numbness or weakness in legs, not attributed to pain:  no     Intensity:     History: severe  Pain interferes with job: YES  History of back problems: no prior back problems  Any previous MRI or X-rays: None  Sees a specialist for back pain:  No  Therapies tried without relief: cold    Alleviating factors:   Improved by: heat and prescribed meds     Precipitating factors:  Worsened by: Walking and bending    Functional and Psychosocial Screen (Antonella STarT Back):      Not performed today          Accompanying Signs & Symptoms:  Risk of Fracture:  None  Risk of Cauda Equina:  None  Risk of Infection:  None  Risk of Cancer:  None  Risk of Ankylosing Spondylitis:  Onset at age <35, male, AND morning back stiffness. no       43 year old male presents for follow up of right low back pain. He's improving quite a bit. No more radiculopathy. Still a little pain with walking. A lot of pain with twisting, pain radiates to right hip. No numbness at all anymore. Using hot oil and ice. Taking Flexeril 5 mg BID and naproxen 500 mg daily. No pain with sitting. No loss of bowel or bladder. No saddle anesthesia.    Problem list and histories reviewed & adjusted, as indicated.  Additional history: as documented    Patient Active Problem List   Diagnosis     Viral hepatitis B chronic (H)     CARDIOVASCULAR SCREENING; LDL GOAL LESS THAN 160     Elevated blood pressure reading without diagnosis of hypertension     Acute right-sided low back pain with right-sided sciatica     Past Surgical History:   Procedure Laterality Date     NO HISTORY OF SURGERY         Social History   Substance Use Topics      "Smoking status: Never Smoker     Smokeless tobacco: Never Used     Alcohol use No     Family History   Problem Relation Age of Onset     Hypertension Mother      Cerebrovascular Disease Mother      Asthma No family hx of      C.A.D. No family hx of      Diabetes No family hx of      Breast Cancer No family hx of      Cancer - colorectal No family hx of      Prostate Cancer No family hx of          Current Outpatient Prescriptions   Medication Sig Dispense Refill     cyclobenzaprine (FLEXERIL) 5 MG tablet Take 1 tablet (5 mg) by mouth 3 times daily as needed for muscle spasms 30 tablet 0     naproxen (NAPROSYN) 500 MG tablet Take 1 tablet (500 mg) by mouth 2 times daily as needed for moderate pain 20 tablet 0     No Known Allergies  Labs reviewed in EPIC    Reviewed and updated as needed this visit by clinical staff  Tobacco  Allergies  Meds  Problems  Med Hx  Surg Hx  Fam Hx  Soc Hx        Reviewed and updated as needed this visit by Provider  Allergies  Meds  Problems         ROS:  Constitutional, HEENT, cardiovascular, pulmonary, gi and gu systems are negative, except as otherwise noted.    OBJECTIVE:     /80 (BP Location: Right arm, Patient Position: Chair, Cuff Size: Adult Regular)  Pulse 76  Temp 97.5  F (36.4  C) (Oral)  Resp 16  Ht 5' 7\" (1.702 m)  Wt 150 lb (68 kg)  SpO2 97%  BMI 23.49 kg/m2  Body mass index is 23.49 kg/(m^2).  GENERAL: healthy, alert and no distress  RESP: lungs clear to auscultation - no rales, rhonchi or wheezes  CV: regular rate and rhythm, normal S1 S2, no S3 or S4, no murmur, click or rub, no peripheral edema and peripheral pulses strong  MS: no gross musculoskeletal defects noted, no edema  SKIN: no suspicious lesions or rashes  NEURO: Normal strength and tone, mentation intact and speech normal  Comprehensive back pain exam:  Tenderness of right low back. No tenderness over the midline, Range of motion not limited by pain, Lower extremity strength functional " and equal on both sides, Lower extremity reflexes within normal limits bilaterally, Lower extremity sensation normal and equal on both sides and Straight leg raise negative bilaterally  PSYCH: mentation appears normal, affect normal/bright    Diagnostic Test Results:  none     ASSESSMENT/PLAN:     1. Acute right-sided low back pain without sciatica  No red flags.   Ice or heat 15 minutes 4-6 times daily  Gentle stretching  Movement is key for early relief of back pain  You can take flexeril 5 mg up to 3 times daily as needed for back pain/spasms. No driving, operating machinery, or drinking alcohol while taking.  Start naproxen 500 mg every 12 hours as needed for pain/inflammation. Take with food.  If worsening or not improving in 1 week, follow up with primary care provider, sooner if needed.  If you develop loss of bowel or bladder, saddle anesthesia, or foot drop, go to emergency department.      See Patient Instructions    The benefits, risks and potential side effects were discussed in detail. Black box warnings discussed as relevant. All patient questions were answered. The patient was instructed to follow up immediately if any adverse reactions develop.    Patient verbalizes understanding and agrees with plan of care. Patient stable for discharge.      CR Acosta [facility]

## 2018-07-06 NOTE — TELEPHONE ENCOUNTER
This writer attempted to contact [facility] on 07/06/18      Reason for call providers message and left detailed message.      If patient calls back:   Patient contacted by 1st floor [place] Care Team (MA/TC). Inform patient that someone from the team will contact them, document that pt called and route to care team.         Ene Jay, SMA

## 2018-07-06 NOTE — TELEPHONE ENCOUNTER
Reason for Call:  Other *    Detailed comments: patient calling asking for his information of why patient can only lift 5 lbs, and what treatment patient is having going forward to be faxed to [phone], for short term disability. Any questions please call patient. Patient needs this done as soon as possible due to patient has not been able to get paid.    Phone Number Patient can be reached at: Other phone number:     Stacy Ferrer (Self) [phone] (H         Best Time: any    Can we leave a detailed message on this number? YES    Call taken on 7/6/2018 at 12:02 PM by Aminta Garcia

## 2018-07-09 NOTE — TELEPHONE ENCOUNTER
Printed clinic notes and last form completed by Alma and faxed.  Olivier Goldstein,  [facility]

## 2018-07-09 NOTE — TELEPHONE ENCOUNTER
..Reason for Call:   Patient information    Detailed comments: Missing information: they need the office notes from June 13th and 27th and the ones from 07- plus the questionaire also    Sent via Fax - needs completion    Phone Number Patient can be reached at:   phone number:  [phone] ext [phone]  Best Time: any    Can we leave a detailed message on this number? YES    Call taken on 7/9/2018 at 1:37 PM by Loida Camarena

## 2018-07-09 NOTE — TELEPHONE ENCOUNTER
Letter faxed.  Olivier Goldstein,  [facility]    Will call patient to notify.  Olivier Goldstein,  [facility]

## 2018-07-16 ENCOUNTER — OFFICE VISIT (OUTPATIENT)
Dept: FAMILY MEDICINE | Facility: CLINIC | Age: 43
End: 2018-07-16
Payer: COMMERCIAL

## 2018-07-16 VITALS
TEMPERATURE: 97.7 F | SYSTOLIC BLOOD PRESSURE: 117 MMHG | HEART RATE: 78 BPM | OXYGEN SATURATION: 98 % | DIASTOLIC BLOOD PRESSURE: 81 MMHG | RESPIRATION RATE: 16 BRPM | BODY MASS INDEX: 23.07 KG/M2 | WEIGHT: 147 LBS | HEIGHT: 67 IN

## 2018-07-16 DIAGNOSIS — M54.50 ACUTE RIGHT-SIDED LOW BACK PAIN WITHOUT SCIATICA: Primary | ICD-10-CM

## 2018-07-16 PROCEDURE — 99213 OFFICE O/P EST LOW 20 MIN: CPT | Performed by: NURSE PRACTITIONER

## 2018-07-16 RX ORDER — CYCLOBENZAPRINE HCL 5 MG
5 TABLET ORAL 3 TIMES DAILY PRN
Qty: 30 TABLET | Refills: 0 | Status: SHIPPED | OUTPATIENT
Start: 2018-07-16

## 2018-07-16 RX ORDER — NAPROXEN 500 MG/1
500 TABLET ORAL 2 TIMES DAILY PRN
Qty: 20 TABLET | Refills: 0 | Status: SHIPPED | OUTPATIENT
Start: 2018-07-16

## 2018-07-16 ASSESSMENT — PAIN SCALES - GENERAL: PAINLEVEL: MILD PAIN (3)

## 2018-07-16 NOTE — PATIENT INSTRUCTIONS
Ice or heat 15 minutes 4-6 times daily  Gentle stretching  Movement is key for early relief of back pain  If worsening or not improving in 1-2  weeks, follow up with primary care provider, sooner if needed.  Please schedule with physical therapy   If you develop loss of bowel or bladder, saddle anesthesia, or foot drop, go to emergency department.      At [facility], we strive to deliver an exceptional experience to you, every time we see you.  If you receive a survey in the mail, please send us back your thoughts. We really do value your feedback.    Based on your medical history, these are the current health maintenance/preventive care services that you are due for (some may have been done at this visit.)  Health Maintenance Due   Topic Date Due     HIV SCREEN (SYSTEM ASSIGNED)  06/05/1992     LIPID SCREEN Q5 YR MALE (SYSTEM ASSIGNED)  06/05/2009         Suggested websites for health information:  Www.[employer] : Up to date and easily searchable information on multiple topics.  Www.medlineplus.gov : medication info, interactive tutorials, watch real surgeries online  Www.familydoctor.org : good info from the Academy of Family Physicians  Www.cdc.gov : public health info, travel advisories, epidemics (H1N1)  Www.aap.org : children's health info, normal development, vaccinations  Www.health.state.[place].us : [place] dept of health, public health issues in [place], N1N1    Your care team:                            Family Medicine Internal Medicine   MD David San MD Shantel Branch-Fleming, MD Katya Georgiev PA-C Nam Ho, MD Pediatrics   KEVIN Weems, YAYA Toussaint APRN MD Elysia Kc MD Deborah Mielke, MD Kim Thein, APRN CNP      Clinic hours: Monday - Thursday 7 am-7 pm; Fridays 7 am-5 pm.   Urgent care: Monday - Friday 11 am-9 pm; Saturday and Sunday 9 am-5 pm.  Pharmacy : Monday -Thursday 8 am-8 pm; Friday 8 am-6 pm;  Saturday and Sunday 9 am-5 pm.     Clinic: [phone]   Pharmacy: [phone]

## 2018-07-16 NOTE — MR AVS SNAPSHOT
After Visit Summary   7/16/2018    Stacy Ferrer    MRN: [id number]           Patient Information     Date Of Birth          1975        Visit Information        Provider Department      7/16/2018 9:00 AM Alma Duque APRN CNP [facility]        Today's Diagnoses     Acute right-sided low back pain without sciatica    -  1      Care Instructions    Ice or heat 15 minutes 4-6 times daily  Gentle stretching  Movement is key for early relief of back pain  If worsening or not improving in 1-2  weeks, follow up with primary care provider, sooner if needed.  Please schedule with physical therapy   If you develop loss of bowel or bladder, saddle anesthesia, or foot drop, go to emergency department.      At [facility], we strive to deliver an exceptional experience to you, every time we see you.  If you receive a survey in the mail, please send us back your thoughts. We really do value your feedback.    Based on your medical history, these are the current health maintenance/preventive care services that you are due for (some may have been done at this visit.)  Health Maintenance Due   Topic Date Due     HIV SCREEN (SYSTEM ASSIGNED)  06/05/1992     LIPID SCREEN Q5 YR MALE (SYSTEM ASSIGNED)  06/05/2009         Suggested websites for health information:  Www.[employer] : Up to date and easily searchable information on multiple topics.  Www.medlineplus.gov : medication info, interactive tutorials, watch real surgeries online  Www.familydoctor.org : good info from the Academy of Family Physicians  Www.cdc.gov : public health info, travel advisories, epidemics (H1N1)  Www.aap.org : children's health info, normal development, vaccinations  Www.health.state.[place].us : [place] dept of health, public health issues in [place], N1N1    Your care team:                            Family Medicine Internal Medicine   MD David San MD Shantel Branch-Fleming, MD Katya  "Esha Pacheco MD Pediatrics   KEVIN Weems, YAYA Toussaint APRBALTAZAR CNP   MD Elysia Woodall MD Deborah Mielke, MD Kim Thein, APRBALTAZAR [facility]      Clinic hours: Monday - Thursday 7 am-7 pm; Fridays 7 am-5 pm.   Urgent care: Monday - Friday 11 am-9 pm; Saturday and Sunday 9 am-5 pm.  Pharmacy : Monday -Thursday 8 am-8 pm; Friday 8 am-6 pm; Saturday and Sunday 9 am-5 pm.     Clinic: [phone]   Pharmacy: [phone]            Follow-ups after your visit        Additional Services     PHYSICAL THERAPY REFERRAL       *This therapy referral will be filtered to a centralized scheduling office at [facility] and the patient will receive a call to schedule an appointment at a [facility] location most convenient for them. *     [facility] provides Physical Therapy evaluation and treatment and many specialty services across the [facility] system.  If requesting a specialty program, please choose from the list below.    If you have not heard from the scheduling office within 2 business days, please call [phone] for all locations, with the exception of [place], please call [phone] and [facility], please call [phone]  Treatment: Evaluation & Treatment  Special Instructions/Modalities: eval and treat  Special Programs: None    Please be aware that coverage of these services is subject to the terms and limitations of your health insurance plan.  Call member services at your health plan with any benefit or coverage questions.      **Note to Provider:  If you are referring outside of [facility] for the therapy appointment, please list the name of the location in the \"special instructions\" above, print the referral and give to the patient to schedule the appointment.                  Who to contact     If you have questions or need follow up information about today's clinic visit or your schedule please contact [facility] " "[facility] directly at [phone].  Normal or non-critical lab and imaging results will be communicated to you by MyChart, letter or phone within 4 business days after the clinic has received the results. If you do not hear from us within 7 days, please contact the clinic through MyChart or phone. If you have a critical or abnormal lab result, we will notify you by phone as soon as possible.  Submit refill requests through [facility] [employer] or call your pharmacy and they will forward the refill request to us. Please allow 3 business days for your refill to be completed.          Additional Information About Your Visit        Care EveryWhere ID     This is your Care EveryWhere ID. This could be used by other organizations to access your [facility] medical records  [id number]        Your Vitals Were     Pulse Temperature Respirations Height Pulse Oximetry BMI (Body Mass Index)    78 97.7  F (36.5  C) (Oral) 16 5' 7\" (1.702 m) 98% 23.02 kg/m2       Blood Pressure from Last 3 Encounters:   07/16/18 117/81   07/06/18 120/80   06/27/18 132/86    Weight from Last 3 Encounters:   07/16/18 147 lb (66.7 kg)   07/06/18 150 lb (68 kg)   06/27/18 149 lb (67.6 kg)              We Performed the Following     PHYSICAL THERAPY REFERRAL          Today's Medication Changes          These changes are accurate as of 7/16/18  9:35 AM.  If you have any questions, ask your nurse or doctor.               Stop taking these medicines if you haven't already. Please contact your care team if you have questions.     IBUPROFEN PO   Stopped by:  Alma Duque APRN CNP                Where to get your medicines      These medications were sent to [facility] - [place], [place] - [address] AT [facility] [address], [place] [facility] [address]     Phone:  [phone]     cyclobenzaprine 5 MG tablet    naproxen 500 MG tablet                Primary Care Provider Office Phone # Fax #    Shahab Pacheco MD " [phone]       [address]  [facility] [address]        Equal Access to Services     CHAGO YATES : Hadii aad ku hadwai Soaliviaali, waaxda luqadaha, qaybta kaalmada adekaelyn, johanna westmarco mima. So [facility] [phone].    ATENCIÓN: Si habla español, tiene a del castillo disposición servicios gratuitos de asistencia lingüística. Llame al [phone].    We comply with applicable federal civil rights laws and [place] laws. We do not discriminate on the basis of race, color, national origin, age, disability, sex, sexual orientation, or gender identity.            Thank you!     Thank you for choosing [facility]  for your care. Our goal is always to provide you with excellent care. Hearing back from our patients is one way we can continue to improve our services. Please take a few minutes to complete the written survey that you may receive in the mail after your visit with us. Thank you!             Your Updated Medication List - Protect others around you: Learn how to safely use, store and throw away your medicines at www.disposemymeds.org.          This list is accurate as of 7/16/18  9:35 AM.  Always use your most recent med list.                   Brand Name Dispense Instructions for use Diagnosis    cyclobenzaprine 5 MG tablet    FLEXERIL    30 tablet    Take 1 tablet (5 mg) by mouth 3 times daily as needed for muscle spasms    Acute right-sided low back pain without sciatica       naproxen 500 MG tablet    NAPROSYN    20 tablet    Take 1 tablet (500 mg) by mouth 2 times daily as needed for moderate pain    Acute right-sided low back pain without sciatica

## 2018-07-16 NOTE — PROGRESS NOTES
SUBJECTIVE:   Stacy Ferrer is a 43 year old male who presents to clinic today for the following health issues:      Back Pain       Duration: 06/12/18        Specific cause: lifting    Description:   Location of pain: low back right  Character of pain: dull ache and stabbing when turning  Pain radiation:radiates into the right buttocks and radiates into the right leg  New numbness or weakness in legs, not attributed to pain:  no     Intensity: Currently 3/10    History:   Pain interferes with job: YES  History of back problems: no prior back problems  Any previous MRI or X-rays: None  Sees a specialist for back pain:  No  Therapies tried without relief: none    Alleviating factors:   Improved by: prescribed meds and heat      Precipitating factors:  Worsened by: Lifting, Bending and Walking    Functional and Psychosocial Screen (Antonella STarT Back):      Not performed today    43 year old male presenting for follow up of right low back pain. He has been off of work for this injury since 6/13 and had been improving. He decided on Friday that he needed to get back to work as he needs money and increased the amount of time he was walking to 40 minutes and tried to lift 25-30 pounds. He reports he had increased right sided back pain with right radiculopathy while walking and after trying to life 25-30 pounds he had increased pain and had difficulty standing up. He is still taking ibuprofen and using ice for the pain with some relief.  He has also been stretching every day.       Accompanying Signs & Symptoms:  Risk of Fracture:  None  Risk of Cauda Equina:  None  Risk of Infection:  None  Risk of Cancer:  None  Risk of Ankylosing Spondylitis:  Onset at age <35, male, AND morning back stiffness. no       Problem list and histories reviewed & adjusted, as indicated.  Additional history: as documented    Patient Active Problem List   Diagnosis     Viral hepatitis B chronic (H)     CARDIOVASCULAR SCREENING; LDL GOAL LESS THAN  "160     Elevated blood pressure reading without diagnosis of hypertension     Acute right-sided low back pain with right-sided sciatica     Past Surgical History:   Procedure Laterality Date     NO HISTORY OF SURGERY         Social History   Substance Use Topics     Smoking status: Never Smoker     Smokeless tobacco: Never Used     Alcohol use No     Family History   Problem Relation Age of Onset     Hypertension Mother      Cerebrovascular Disease Mother      Asthma No family hx of      C.A.D. No family hx of      Diabetes No family hx of      Breast Cancer No family hx of      Cancer - colorectal No family hx of      Prostate Cancer No family hx of          Current Outpatient Prescriptions   Medication Sig Dispense Refill     cyclobenzaprine (FLEXERIL) 5 MG tablet Take 1 tablet (5 mg) by mouth 3 times daily as needed for muscle spasms 30 tablet 0     naproxen (NAPROSYN) 500 MG tablet Take 1 tablet (500 mg) by mouth 2 times daily as needed for moderate pain 20 tablet 0     No Known Allergies  BP Readings from Last 3 Encounters:   07/16/18 117/81   07/06/18 120/80   06/27/18 132/86    Wt Readings from Last 3 Encounters:   07/16/18 147 lb (66.7 kg)   07/06/18 150 lb (68 kg)   06/27/18 149 lb (67.6 kg)           Labs reviewed in EPIC    Reviewed and updated as needed this visit by clinical staff  Tobacco  Allergies  Meds  Problems  Med Hx  Surg Hx  Fam Hx  Soc Hx        Reviewed and updated as needed this visit by Provider  Allergies  Meds  Problems         ROS:  Constitutional, HEENT, cardiovascular, pulmonary, gi and gu systems are negative, except as otherwise noted.    OBJECTIVE:     /81 (BP Location: Right arm, Patient Position: Chair, Cuff Size: Adult Regular)  Pulse 78  Temp 97.7  F (36.5  C) (Oral)  Resp 16  Ht 5' 7\" (1.702 m)  Wt 147 lb (66.7 kg)  SpO2 98%  BMI 23.02 kg/m2  Body mass index is 23.02 kg/(m^2).  GENERAL: healthy, alert and no distress  MS: no gross musculoskeletal defects " noted, no edema  NEURO: Normal strength and tone, mentation intact and speech normal  Comprehensive back pain exam:  Tenderness of right low back, Pain limits the following motions: bending forward, twisting to the left, Lower extremity strength functional and equal on both sides, Lower extremity reflexes within normal limits bilaterally, Lower extremity sensation normal and equal on both sides and Straight leg raise negative bilaterally  PSYCH: mentation appears normal, affect normal/bright    Diagnostic Test Results:  none     ASSESSMENT/PLAN:   1. Acute right-sided low back pain without sciatica  No red flags. No current radiculopathy but is exacerbated with increased activity and weight lifting. He was improving but then lifted more than restrictions and suffered a set back. Will refer to physical therapy to improve back strength.   - PHYSICAL THERAPY REFERRAL  - cyclobenzaprine (FLEXERIL) 5 MG tablet; Take 1 tablet (5 mg) by mouth 3 times daily as needed for muscle spasms  Dispense: 30 tablet; Refill: 0  - naproxen (NAPROSYN) 500 MG tablet; Take 1 tablet (500 mg) by mouth 2 times daily as needed for moderate pain  Dispense: 20 tablet; Refill: 0    See Patient Instructions  Ice or heat 15 minutes 4-6 times daily  Gentle stretching  Movement is key for early relief of back pain  If worsening or not improving in 1-2  weeks, follow up with primary care provider, sooner if needed.  Please schedule with physical therapy   If you develop loss of bowel or bladder, saddle anesthesia, or foot drop, go to emergency department.    The benefits, risks and potential side effects were discussed in detail.   Black box warnings discussed as relevant. All patient questions were answered. The patient was instructed to follow up immediately if any adverse reactions develop.   Patient verbalizes understanding and agrees with plan of care. Patient stable for discharge.    Kenya Jimenez, RN DNP Student.     Alma MONIQUE, was  present with the nurse practitioner student who participated in the service and in the documentation of the note.  I have verified the history and personally performed the physical exam and medical decision making.  I agree with the assessment and plan of care as documented in the note.      Items personally reviewed: vitals.            CR Acosta [facility]

## 2018-07-16 NOTE — LETTER
July 16, 2018      Stacy Ferrer  [address]  [place] [facility] [address]    To Whom It May Concern:      Stacy Ferrer  was seen today, 7/16/18, for a low back injury. He was seen previously for this injury on 6/13/18, 6/27/18, 7/6/18. He is improving. If he returns to work, he must not lift more than 10 lb, no repetitive movements, no twisting or bending. If a job cannot be found to meet these parameters, he may not work until he has been re-evaluated in 1-2 weeks. He is to be icing/using heat 15 minutes 4-6 times daily, gentle stretching, moving around. He can take flexeril 5 mg up to 3 times daily as needed for back pain/spasms. No driving, operating machinery, or drinking alcohol while taking.  He is also taking naproxen 500 mg every 12 hours as needed for pain/inflammation. He will start physical therapy. Please let me know if there are any questions.      Sincerely,         CR Acosta CNP

## 2018-07-17 ENCOUNTER — THERAPY VISIT (OUTPATIENT)
Dept: PHYSICAL THERAPY | Facility: CLINIC | Age: 43
End: 2018-07-17
Payer: COMMERCIAL

## 2018-07-17 DIAGNOSIS — S33.5XXA LUMBAR SPRAIN, INITIAL ENCOUNTER: Primary | ICD-10-CM

## 2018-07-17 PROCEDURE — 97110 THERAPEUTIC EXERCISES: CPT | Mod: GP | Performed by: PHYSICAL THERAPIST

## 2018-07-17 PROCEDURE — 97161 PT EVAL LOW COMPLEX 20 MIN: CPT | Mod: GP | Performed by: PHYSICAL THERAPIST

## 2018-07-17 PROCEDURE — 97112 NEUROMUSCULAR REEDUCATION: CPT | Mod: GP | Performed by: PHYSICAL THERAPIST

## 2018-07-17 NOTE — PROGRESS NOTES
[facility] Initial Evaluation  Subjective:  Patient is a 43 year old male presenting with rehab back hpi. The history is provided by the patient. No  was used.   Stacy Ferrer is a 43 year old male with a lumbar condition.  Condition occurred with:  Lifting.  Condition occurred: at home.  This is a new condition  Patient presents to PT today with c/o LBP.  Patient stated on  6/13/18 he injured his back;  Occurred while lifting some furniture to help his mom up from the floor.  Patient stated he heard a noise from his back when he lifted but pain did not start until later.  Patient was unable to work due to pain.    Patient referred to PT 7/16/18.        .    Patient reports pain:  Lumbar spine right (R Buttock).  Radiates to:  Gluteals right and foot right (first few days).  Pain is described as aching and is intermittent (R hip pain) and reported as 3/10.  Associated symptoms:  Loss of motion/stiffness and loss of strength. Pain is worse during the day.  Symptoms are exacerbated by walking, lying down, lifting and bending and relieved by ice and muscle relaxants.  Since onset symptoms are gradually improving.  Special testing: none.  Previous treatment includes other (massage from family member and massage chair).  There was mild improvement following previous treatment.  General health as reported by patient is good.  Pertinent medical history includes:  None.  Medical allergies: no.  Other surgeries include:  No.  Current medications:  Muscle relaxants and other (naproxen).  Current occupation is  for [employer].      Tried to go back to work today but MD would not write letter to return to work.  Restrictions: nothing > 10# with lifting.  Pt to follow up with MD in the next 2 weeks to see if he can return to work..  Patient is currently not working due to present treatment problem.  Primary job tasks include:  Lifting, repetitive tasks, prolonged standing and  other (push/pull).    Barriers include:  None as reported by the patient.    Red flags:  None as reported by the patient.                        Objective:  Standing Alignment:    Cervical/Thoracic:  Forward head  Shoulder/UE:  Rounded shoulders  Lumbar:  Lordosis decr                           Lumbar/SI Evaluation  ROM:    AROM Lumbar:   Flexion:          Mid shin  Ext:                    Mod loss   Side Bend:        Left:  Mid thigh    Right:  Mid thigh  Rotation:           Left:     Right:   Side Glide:        Left:     Right:           Lumbar Myotomes:  normal            Lumbar DTR's:  not assessed        Lumbar Dermtomes:  normal                Neural Tension/Mobility:      Left side:SLR or SLR w/DF  negative.     Right side:   SLR w/DF or SLR  negative.   Lumbar Palpation:    Tenderness present at Left:    Quadratus Lumborum and Erector Spinae  Tenderness present at Right: Quadratus Lumborum and Erector Spinae                                                     General     ROS    Assessment/Plan:    Patient is a 43 year old male with lumbar complaints.    Patient has the following significant findings with corresponding treatment plan.                Diagnosis 1:  LBP  Pain -  hot/cold therapy, US and manual therapy  Decreased ROM/flexibility - manual therapy and therapeutic exercise  Impaired muscle performance - neuro re-education  Decreased function - therapeutic activities  Impaired posture - neuro re-education    Therapy Evaluation Codes:   1) History comprised of:   Personal factors that impact the plan of care:      None.    Comorbidity factors that impact the plan of care are:      None.     Medications impacting care: Muscle relaxant and Pain.  2) Examination of Body Systems comprised of:   Body structures and functions that impact the plan of care:      Lumbar spine.   Activity limitations that impact the plan of care are:      Bending, Lifting, Squatting/kneeling and Working.  3) Clinical  presentation characteristics are:   Stable/Uncomplicated.  4) Decision-Making    Low complexity using standardized patient assessment instrument and/or measureable assessment of functional outcome.  Cumulative Therapy Evaluation is: Low complexity.    Previous and current functional limitations:  (See Goal Flow Sheet for this information)    Short term and Long term goals: (See Goal Flow Sheet for this information)     Communication ability:  Patient appears to be able to clearly communicate and understand verbal and written communication and follow directions correctly.  Treatment Explanation - The following has been discussed with the patient:   RX ordered/plan of care  Anticipated outcomes  Possible risks and side effects  This patient would benefit from PT intervention to resume normal activities.   Rehab potential is good.    Frequency:  1 X week, once daily  Duration:  for 6-8 visits  Discharge Plan:  Achieve all LTG.  Independent in home treatment program.  Reach maximal therapeutic benefit.    Please refer to the daily flowsheet for treatment today, total treatment time and time spent performing 1:1 timed codes.

## 2018-07-17 NOTE — MR AVS SNAPSHOT
After Visit Summary   7/17/2018    Stacy Ferrer    MRN: [id number]           Patient Information     Date Of Birth          1975        Visit Information        Provider Department      7/17/2018 3:00 PM Glory Vásquez, PT [facility]        Today's Diagnoses     Lumbar sprain, initial encounter    -  1       Follow-ups after your visit        Your next 10 appointments already scheduled     Jul 23, 2018  8:20 AM CDT   GIRISH Spine with Glory Vásquez PT   [facility] ([facility]  )    [address]  [place] [address]   [phone]            Jul 30, 2018  8:20 AM CDT   GIRISH Spine with Glory Vásquez PT   [facility] ([facility]  )    [address]  [place] [address]   [phone]            Aug 06, 2018  9:00 AM CDT   Office Visit with CR Burns CNP   [facility]    [address] [address] [address]   [phone]           Bring a current list of meds and any records pertaining to this visit. For Physicals, please bring immunization records and any forms needing to be filled out. Please arrive 10 minutes early to complete paperwork.              Who to contact     If you have questions or need follow up information about today's clinic visit or your schedule please contact [facility] directly at [phone].  Normal or non-critical lab and imaging results will be communicated to you by MyChart, letter or phone within 4 business days after the clinic has received the results. If you do not hear from us within 7 days, please contact the clinic through MyChart or phone. If you have a critical or abnormal lab result, we will notify you by phone as soon as possible.  Submit refill requests through [employer] or call your pharmacy and they will  forward the refill request to us. Please allow 3 business days for your refill to be completed.          Additional Information About Your Visit        Care EveryWhere ID     This is your Care EveryWhere ID. This could be used by other organizations to access your [facility] medical records  [id number]         Blood Pressure from Last 3 Encounters:   07/16/18 117/81   07/06/18 120/80   06/27/18 132/86    Weight from Last 3 Encounters:   07/16/18 66.7 kg (147 lb)   07/06/18 68 kg (150 lb)   06/27/18 67.6 kg (149 lb)              We Performed the Following     HC PT EVAL, LOW COMPLEXITY     GIRISH INITIAL EVAL REPORT     NEUROMUSCULAR RE-EDUCATION     THERAPEUTIC EXERCISES        Primary Care Provider Office Phone # Fax #    Shahab Pacheco [phone]       [address] [place]  [facility] [address]        Equal Access to Services     [facility]: Hadii aad ku hadasho Soomaali, waaxda luqadaha, qaybta kaalmada adeegyada, waxay idiin hayaan george nolan . So [facility] [phone].    ATENCIÓN: Si habla español, tiene a del castillo disposición servicios gratuitos de asistencia lingüística. Betty al [phone].    We comply with applicable federal civil rights laws and [place] laws. We do not discriminate on the basis of race, color, national origin, age, disability, sex, sexual orientation, or gender identity.            Thank you!     Thank you for choosing [facility]  for your care. Our goal is always to provide you with excellent care. Hearing back from our patients is one way we can continue to improve our services. Please take a few minutes to complete the written survey that you may receive in the mail after your visit with us. Thank you!             Your Updated Medication List - Protect others around you: Learn how to safely use, store and throw away your medicines at www.disposemymeds.org.          This list is accurate as of 7/17/18 11:59 PM.  Always use your most  recent med list.                   Brand Name Dispense Instructions for use Diagnosis    cyclobenzaprine 5 MG tablet    FLEXERIL    30 tablet    Take 1 tablet (5 mg) by mouth 3 times daily as needed for muscle spasms    Acute right-sided low back pain without sciatica       naproxen 500 MG tablet    NAPROSYN    20 tablet    Take 1 tablet (500 mg) by mouth 2 times daily as needed for moderate pain    Acute right-sided low back pain without sciatica

## 2018-07-18 PROBLEM — S33.5XXA LUMBAR SPRAIN, INITIAL ENCOUNTER: Status: ACTIVE | Noted: 2018-07-18

## 2018-07-30 ENCOUNTER — TELEPHONE (OUTPATIENT)
Dept: FAMILY MEDICINE | Facility: CLINIC | Age: 43
End: 2018-07-30

## 2018-07-30 ENCOUNTER — THERAPY VISIT (OUTPATIENT)
Dept: PHYSICAL THERAPY | Facility: CLINIC | Age: 43
End: 2018-07-30
Payer: COMMERCIAL

## 2018-07-30 DIAGNOSIS — S33.5XXA LUMBAR SPRAIN, INITIAL ENCOUNTER: ICD-10-CM

## 2018-07-30 PROCEDURE — 97140 MANUAL THERAPY 1/> REGIONS: CPT | Mod: GP | Performed by: PHYSICAL THERAPIST

## 2018-07-30 PROCEDURE — 97110 THERAPEUTIC EXERCISES: CPT | Mod: GP | Performed by: PHYSICAL THERAPIST

## 2018-07-30 NOTE — TELEPHONE ENCOUNTER
...Reason for Call:  Other     Detailed comments: Patient called said he need his last visit faxed to [facility] at [phone].    Phone Number Patient can be reached at: Home number on file [phone] (home)    Best Time: anytime    Can we leave a detailed message on this number? YES    Call taken on 7/30/2018 at 3:47 PM by Aubrey Covarrubias

## 2018-08-02 ENCOUNTER — TELEPHONE (OUTPATIENT)
Dept: FAMILY MEDICINE | Facility: CLINIC | Age: 43
End: 2018-08-02

## 2018-08-02 NOTE — TELEPHONE ENCOUNTER
[facility] staff: Caesar is requesting the following information:  PT name: Glory Vásquez, PT  Location: [facility]  Dx testing: (eg: MRI or CT or X-ray)  Next apt: confirmed  Return to work: Is there an estimated time for the patient to return to work with full duties? Not sure. This can be determine at his next office visit or until we hear back from [facility].  Maria Esther Ibarra

## 2018-08-02 NOTE — TELEPHONE ENCOUNTER
I haven't seen patient for a couple of weeks. He has previously been released to work with restrictions. It is my understanding that his work has told him he can't work if he is not 100%.

## 2018-08-02 NOTE — TELEPHONE ENCOUNTER
.Reason for Call:  Other     Detailed comments: Caesar from [facility] calling to get clarification on the patient conditions from [place] Duque and also the name and phone of physical therapist his going to be seeing. Caesar also wondering if the patient hard any diagnostic  imaging done. Please call her back as regard to this message.      Phone Number Patient can be reached at: Other phone number:  [phone] ext [phone] and fax # [phone]    Best Time: any    Can we leave a detailed message on this number? YES    Call taken on 8/2/2018 at 11:26 AM by Silvia Perez

## 2018-08-06 ENCOUNTER — OFFICE VISIT (OUTPATIENT)
Dept: FAMILY MEDICINE | Facility: CLINIC | Age: 43
End: 2018-08-06
Payer: COMMERCIAL

## 2018-08-06 ENCOUNTER — RADIANT APPOINTMENT (OUTPATIENT)
Dept: GENERAL RADIOLOGY | Facility: CLINIC | Age: 43
End: 2018-08-06
Attending: NURSE PRACTITIONER
Payer: COMMERCIAL

## 2018-08-06 VITALS
OXYGEN SATURATION: 100 % | HEART RATE: 63 BPM | SYSTOLIC BLOOD PRESSURE: 120 MMHG | BODY MASS INDEX: 23.86 KG/M2 | HEIGHT: 67 IN | DIASTOLIC BLOOD PRESSURE: 90 MMHG | RESPIRATION RATE: 16 BRPM | TEMPERATURE: 97.4 F | WEIGHT: 152 LBS

## 2018-08-06 DIAGNOSIS — M54.41 ACUTE RIGHT-SIDED LOW BACK PAIN WITH RIGHT-SIDED SCIATICA: ICD-10-CM

## 2018-08-06 DIAGNOSIS — S33.5XXD LUMBAR SPRAIN, SUBSEQUENT ENCOUNTER: ICD-10-CM

## 2018-08-06 DIAGNOSIS — S33.5XXA LUMBAR SPRAIN, INITIAL ENCOUNTER: ICD-10-CM

## 2018-08-06 DIAGNOSIS — M54.50 ACUTE RIGHT-SIDED LOW BACK PAIN WITHOUT SCIATICA: Primary | ICD-10-CM

## 2018-08-06 PROCEDURE — 99213 OFFICE O/P EST LOW 20 MIN: CPT | Performed by: NURSE PRACTITIONER

## 2018-08-06 PROCEDURE — 72100 X-RAY EXAM L-S SPINE 2/3 VWS: CPT | Mod: FY

## 2018-08-06 ASSESSMENT — PAIN SCALES - GENERAL: PAINLEVEL: MILD PAIN (2)

## 2018-08-06 NOTE — MR AVS SNAPSHOT
After Visit Summary   8/6/2018    Stacy Ferrer    MRN: [id number]           Patient Information     Date Of Birth          1975        Visit Information        Provider Department      8/6/2018 9:00 AM Alma Duque APRN CNP [facility]        Today's Diagnoses     Acute right-sided low back pain without sciatica    -  1    Lumbar sprain, subsequent encounter          Care Instructions    Ice or heat 15 minutes 4-6 times daily  Gentle stretching  Movement is key for early relief of back pain  Keep your appointments with physical therapy   Can continue to use Flexeril and Naproxen for pain and muscle spasms  Follow up in 1 week  If you develop loss of bowel or bladder, numbness in your thighs or groin, or foot drop, go to emergency department.      At [facility], we strive to deliver an exceptional experience to you, every time we see you.  If you receive a survey in the mail, please send us back your thoughts. We really do value your feedback.    Based on your medical history, these are the current health maintenance/preventive care services that you are due for (some may have been done at this visit.)  Health Maintenance Due   Topic Date Due     HIV SCREEN (SYSTEM ASSIGNED)  06/05/1992     LIPID SCREEN Q5 YR MALE (SYSTEM ASSIGNED)  06/05/2009         Suggested websites for health information:  Www.[employer].org : Up to date and easily searchable information on multiple topics.  Www.medlineplus.gov : medication info, interactive tutorials, watch real surgeries online  Www.familydoctor.org : good info from the Academy of Family Physicians  Www.cdc.gov : public health info, travel advisories, epidemics (H1N1)  Www.aap.org : children's health info, normal development, vaccinations  Www.health.state.[place].us : [place] dept of health, public health issues in [place], N1N1    Your care team:                            Family Medicine Internal Medicine   Flavio Patel MD  MD Litzy Bledsoe MD Katya Georgiev PA-C Nam Ho, MD Pediatrics   KEVIN Weems, MD Elysia Myles CNP, MD Deborah Mielke, MD Kim Thein, APRN CNP      Clinic hours: Monday - Thursday 7 am-7 pm; Fridays 7 am-5 pm.   Urgent care: Monday - Friday 11 am-9 pm; Saturday and Sunday 9 am-5 pm.  Pharmacy : Monday -Thursday 8 am-8 pm; Friday 8 am-6 pm; Saturday and Sunday 9 am-5 pm.     Clinic: [phone]   Pharmacy: [phone]    T? ch?m sóc ?au l?ng d??i (low back pain)  H?u h?t m?i ng??i ??u th?nh tho?ng b? ?au l?ng d??i. Cassidy lennox?u tr??ng h?p, v?n ?? không nghiêm tr?ng và ch? c?n t? ch?m sóc là ???c. ?ôi khi ?au l?ng d??i có th? là d?u hi?u cho th?y có m?t v?n ?? l?n h?n. Hãy g?i cho bác s? c?a quý v? n?u c?n ?au tr? l?i th??ng xuyên ho?c ngày càng t? h?n. ?? ch?m sóc l?ng c?a quý v? v? lâu dài, hãy th??ng xuyên t?p th? d?ng, gi?m cân n?ng d? th?a và h?c các t? th? t?t.    Ngh? ng?i ng?n  ?? l?ng c?a quý v? ngh? ng?i m?t daniela ngày ?? h?i ph?c. Dùng ??m c?ng ho?c sàn nhà. S? d?ng g?i ho?c kh?n nh? ?? ch?c ch?n ph?n l?ng d??i c?a quý v?. Gi? cho ??u g?i c?a quý v? h?i harpreet và ??t m?t chi?c g?i khác bên d??i. Gely vài gi?, ng?i d?y và ?i l?i càng lennox?u càng t?t.  Gi?m ?au và s?ng  L?nh giúp gi?m s?ng. C? l?nh và nóng ??u có th? gi?m ?au. B?o v? da c?a quý v? b?ng cách ??t m?t t?m kh?n gi?a c? th? c?a quý v? và red?n l?nh ho?c nóng.    Cassidy vài ngày ??u tiên, ch??m m?t túi ?á cassidy 10-15 phút m?i gi? cassidy khi quý v? ?ang th?c.    Gely vài ngày ??u, th? dùng lennox?t ?? gi?m c?n ?au.    Thu?c bán rufino qu?y có th? giúp ki?m soát c?n ?au và s?ng. Hãy th? aspirin ho?c thu?c thay th? aspirin, ch?ng h?n nh? ibuprofen.    T?p th? d?c  T?p th? d?c có th? giúp l?ng c?a quý v? h?i ph?c. Nó c?ng giúp l?ng c?a quý v? kh?e h?n và asif ho?t h?n, ng?n ng?a tái ch?n th??ng. Hãy h?i bác s? c?a quý v? v? các bài t?p th? d?c c? th? dành cho  l?ng c?a mình.  Áp d?ng t? th? t?t ?? tránh tái ch?n th??ng    Khi di trevon?n, hãy harpreet hông và g?i. ??ng harpreet ? vùng th?t l?ng hay v?n quanh.    Khi nâng, gi? ?? v?t sát v?i c? th? c?a quý v?. ??ng c? nâng ?? n?ng quá s?c c?a b?n thân.    Khi ng?i, nh? ?? ph?n l?ng d??i c?a quý v?. S? d?ng kh?n qu?n quanh n?u c?n.  Hãy g?i cho bác s? c?a quý v? n?u:    Quý v? không th? ??ng ho?c ?i b?.    Quý v? b? s?t trên 101,0 F.    Quý v? ?i ti?u th??ng xuyên, ?au ho?c ra máu.    Quý v? b? ?au b?ng d? d?i.    Quý v? b? ?au nh? c?t ho?c ?âm.    C?n ?au c?a quý v? di?n ra không d?t.    Quý v? b? ?au ho?c tê chân.    Quý v? c?m th?y ?au ? m?t vùng m?i trên l?ng c?a mình.    Quý v? ?? ý th?y c?n ?au không thuyên gi?m judith h?n m?t tu?n.   Date Last Reviewed: 9/29/2015 2000-2017 [employer]. [address] [place] [address], [place] [address]. All rights reserved. This information is not intended as a substitute for professional medical care. Always follow your healthcare professional's instructions.                Follow-ups after your visit        Your next 10 appointments already scheduled     Aug 07, 2018  1:40 PM CDT   GIRISH Spine with Glory Vásquez PT   [facility] ([facility]  )    [address]  [place] [id number] [phone]              Who to contact     If you have questions or need follow up information about today's clinic visit or your schedule please contact [facility] directly at [phone].  Normal or non-critical lab and imaging results will be communicated to you by MyChart, letter or phone within 4 business days after the clinic has received the results. If you do not hear from us within 7 days, please contact the clinic through MyChart or phone. If you have a critical or abnormal lab result, we will notify you by phone as soon as possible.  Submit refill requests through [employer] or call your pharmacy and they will forward the  "refill request to us. Please allow 3 business days for your refill to be completed.          Additional Information About Your Visit        Care EveryWhere ID     This is your Care EveryWhere ID. This could be used by other organizations to access your [place] medical records  [id number]        Your Vitals Were     Pulse Temperature Respirations Height Pulse Oximetry BMI (Body Mass Index)    63 97.4  F (36.3  C) (Oral) 16 5' 7\" (1.702 m) 100% 23.81 kg/m2       Blood Pressure from Last 3 Encounters:   08/06/18 120/90   07/16/18 117/81   07/06/18 120/80    Weight from Last 3 Encounters:   08/06/18 152 lb (68.9 kg)   07/16/18 147 lb (66.7 kg)   07/06/18 150 lb (68 kg)               Primary Care Provider Office Phone # Fax #    Shahab Pacheco [phone]       [address] [place]  [facility] [address]        Equal Access to Services     [facility]: Hadii aad ku hadasho Soomaali, waaxda luqadaha, qaybta kaalmada adeegyada, waxay idiin hayaan williameg lidiaarakareem nolan . So [facility] [phone].    ATENCIÓN: Si habla español, tiene a del castillo disposición servicios gratuitos de asistencia lingüística. [facility] [phone].    We comply with applicable federal civil rights laws and [place] laws. We do not discriminate on the basis of race, color, national origin, age, disability, sex, sexual orientation, or gender identity.            Thank you!     Thank you for choosing [facility]  for your care. Our goal is always to provide you with excellent care. Hearing back from our patients is one way we can continue to improve our services. Please take a few minutes to complete the written survey that you may receive in the mail after your visit with us. Thank you!             Your Updated Medication List - Protect others around you: Learn how to safely use, store and throw away your medicines at www.disposemymeds.org.          This list is accurate as of 8/6/18 10:06 AM.  Always use your most recent med " list.                   Brand Name Dispense Instructions for use Diagnosis    cyclobenzaprine 5 MG tablet    FLEXERIL    30 tablet    Take 1 tablet (5 mg) by mouth 3 times daily as needed for muscle spasms    Acute right-sided low back pain without sciatica       naproxen 500 MG tablet    NAPROSYN    20 tablet    Take 1 tablet (500 mg) by mouth 2 times daily as needed for moderate pain    Acute right-sided low back pain without sciatica

## 2018-08-06 NOTE — LETTER
August 6, 2018      Banner IVAN Ferrer  [address]  [place] [address]          Mr. Ferrer,    Your x-ray results are attached and are normal. Please let us know if you have any questions.  Thank you for allowing us to participate in your care.    Sincerely,      CR Acosta CNP/ymv    Resulted Orders   XR Lumbar Spine 2/3 Views    Narrative    XR LUMBAR SPINE 2-3 VIEWS 8/6/2018 9:30 AM     HISTORY: right low back pain x6 wks; Acute right-sided low back pain  with right-sided sciatica; Lumbar sprain, initial encounter    COMPARISON: None      Impression    IMPRESSION: There are 5 lumbar type vertebrae. Mild scoliosis, convex  right. Mild endplate degenerative changes at L1-2 and L2-3. No  evidence of fracture or malalignment.    VARSHA HENDRICKS MD

## 2018-08-06 NOTE — TELEPHONE ENCOUNTER
Unable to get hold of Caesar. Updated work letter from today's visit faxed to number listed below  Cristi Bell CMA

## 2018-08-06 NOTE — PATIENT INSTRUCTIONS
Ice or heat 15 minutes 4-6 times daily  Gentle stretching  Movement is key for early relief of back pain  Keep your appointments with physical therapy   Can continue to use Flexeril and Naproxen for pain and muscle spasms  Follow up in 1 week  If you develop loss of bowel or bladder, numbness in your thighs or groin, or foot drop, go to emergency department.      At [facility], we strive to deliver an exceptional experience to you, every time we see you.  If you receive a survey in the mail, please send us back your thoughts. We really do value your feedback.    Based on your medical history, these are the current health maintenance/preventive care services that you are due for (some may have been done at this visit.)  Health Maintenance Due   Topic Date Due     HIV SCREEN (SYSTEM ASSIGNED)  06/05/1992     LIPID SCREEN Q5 YR MALE (SYSTEM ASSIGNED)  06/05/2009         Suggested websites for health information:  Www.[employer] : Up to date and easily searchable information on multiple topics.  Www.medlineplus.gov : medication info, interactive tutorials, watch real surgeries online  Www.familydoctor.org : good info from the Academy of Family Physicians  Www.cdc.gov : public health info, travel advisories, epidemics (H1N1)  Www.aap.org : children's health info, normal development, vaccinations  Www.health.state.[place].us : [place] dept of health, public health issues in [place], N1N1    Your care team:                            Family Medicine Internal Medicine   MD David San MD Shantel Branch-Fleming, MD Katya Georgiev PA-C Nam Ho, MD Pediatrics   KEVIN Weems, YAYA Toussaint APRN MD Elysia Kc MD Deborah Mielke, MD Kim Thein, APRN CNP      Clinic hours: Monday - Thursday 7 am-7 pm; Fridays 7 am-5 pm.   Urgent care: Monday - Friday 11 am-9 pm; Saturday and Sunday 9 am-5 pm.  Pharmacy : Monday -Thursday 8 am-8 pm; Friday 8  am-6 pm; Saturday and Sunday 9 am-5 pm.     Clinic: [phone]   Pharmacy: [phone]    T? ch?m sóc ?au l?ng d??i (low back pain)  H?u h?t m?i ng??i ??u th?nh tho?ng b? ?au l?ng d??i. Cassidy lennox?u tr??ng h?p, v?n ?? không nghiêm tr?ng và ch? c?n t? ch?m sóc là ???c. ?ôi khi ?au l?ng d??i có th? là d?u hi?u cho th?y có m?t v?n ?? l?n h?n. Hãy g?i cho bác s? c?a quý v? n?u c?n ?au tr? l?i th??ng xuyên ho?c ngày càng t? h?n. ?? ch?m sóc l?ng c?a quý v? v? lâu dài, hãy th??ng xuyên t?p th? d?ng, gi?m cân n?ng d? th?a và h?c các t? th? t?t.    Ngh? ng?i ng?n  ?? l?ng c?a quý v? ngh? ng?i m?t daniela ngày ?? h?i ph?c. Dùng ??m c?ng ho?c sàn nhà. S? d?ng g?i ho?c kh?n nh? ?? ch?c ch?n ph?n l?ng d??i c?a quý v?. Gi? cho ??u g?i c?a quý v? h?i harpreet và ??t m?t chi?c g?i khác bên d??i. Gely vài gi?, ng?i d?y và ?i l?i càng lennox?u càng t?t.  Gi?m ?au và s?ng  L?nh giúp gi?m s?ng. C? l?nh và nóng ??u có th? gi?m ?au. B?o v? da c?a quý v? b?ng cách ??t m?t t?m kh?n gi?a c? th? c?a quý v? và red?n l?nh ho?c nóng.    Cassidy vài ngày ??u tiên, ch??m m?t túi ?á cassidy 10-15 phút m?i gi? cassidy khi quý v? ?ang th?c.    Gely vài ngày ??u, th? dùng lennox?t ?? gi?m c?n ?au.    Thu?c bán rufino qu?y có th? giúp ki?m soát c?n ?au và s?ng. Hãy th? aspirin ho?c thu?c thay th? aspirin, ch?ng h?n nh? ibuprofen.    T?p th? d?c  T?p th? d?c có th? giúp l?ng c?a quý v? h?i ph?c. Nó c?ng giúp l?ng c?a quý v? kh?e h?n và asif ho?t h?n, ng?n ng?a tái ch?n th??ng. Hãy h?i bác s? c?a quý v? v? các bài t?p th? d?c c? th? dành cho l?ng c?a mình.  Áp d?ng t? th? t?t ?? tránh tái ch?n th??ng    Khi di trevon?n, hãy harpreet hông và g?i. ??ng harpreet ? vùng th?t l?ng hay v?n quanh.    Khi nâng, gi? ?? v?t sát v?i c? th? c?a quý v?. ??ng c? nâng ?? n?ng quá s?c c?a b?n thân.    Khi ng?i, nh? ?? ph?n l?ng d??i c?a quý v?. S? d?ng kh?n qu?n quanh n?u c?n.  Hãy g?i cho bác s? c?a quý v? n?u:    Quý v? không th? ??ng ho?c ?i b?.    Quý v? b? s?t trên 101,0 F.    Quý v? ?i ti?u  th??ng xuyên, ?au ho?c ra máu.    Quý v? b? ?au b?ng d? d?i.    Quý v? b? ?au nh? c?t ho?c ?âm.    C?n ?au c?a quý v? di?n ra không d?t.    Quý v? b? ?au ho?c tê chân.    Quý v? c?m th?y ?au ? m?t vùng m?i trên l?ng c?a mình.    Quý v? ?? ý th?y c?n ?au không thuyên gi?m judith h?n m?t tu?n.   Date Last Reviewed: 9/29/2015 2000-2017 [employer]. [address] [place] [address], [place] [address]. All rights reserved. This information is not intended as a substitute for professional medical care. Always follow your healthcare professional's instructions.

## 2018-08-06 NOTE — PROGRESS NOTES
"  SUBJECTIVE:   Stacy Ferrer is a 43 year old male who presents to clinic today for the following health issues:      Back Pain       Duration: ongoing        Specific cause: work-related    Description:   Location of pain: low back right  Character of pain: stabbing  Pain radiation:none  New numbness or weakness in legs, not attributed to pain:  no     Intensity: moderate    History:   Pain interferes with job: YES  History of back problems: no prior back problems  Any previous MRI or X-rays: None  Sees a specialist for back pain:  No  Therapies tried without relief: none    Alleviating factors:   Improved by: Physical Therapy and described medication     Precipitating factors:  Worsened by: prolonged Sitting    Functional and Psychosocial Screen (HouseTrip STarT Back):      Not performed today          Accompanying Signs & Symptoms:  Risk of Fracture:  None  Risk of Cauda Equina:  None  Risk of Infection:  None  Risk of Cancer:  None  Risk of Ankylosing Spondylitis:  Onset at age <35, male, AND morning back stiffness. no                43 year old male presents with continued right low back pain. It started when his mother fell between a couch and he lifted the couch quickly to help get her out. He felt pain immediately. He initially has some sciatica which has since resolved. He reports a focal pain to right low back to the right of the spine about 1.5-2 inches and is approx 2-3 in long. No rash or change in skin color. No loss of bowel or bladder. No saddle anesthesia. No fever. States the area feel hot and like something is poking him. \"It bothers me a lot.\" He's tried rest, ice, heat, ibuprofen, naproxen, flexeril, physical therapy and massage. Doesn't think he can return to work yet as his job requires quite a bit of pushing/pulling and walking. 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 Had physical therapy next tomorrow. His goal is to return to work in the next 2 weeks.      Problem list and histories " "reviewed & adjusted, as indicated.  Additional history: as documented    Patient Active Problem List   Diagnosis     Viral hepatitis B chronic (H)     CARDIOVASCULAR SCREENING; LDL GOAL LESS THAN 160     Elevated blood pressure reading without diagnosis of hypertension     Acute right-sided low back pain with right-sided sciatica     Lumbar sprain, initial encounter     Past Surgical History:   Procedure Laterality Date     NO HISTORY OF SURGERY         Social History   Substance Use Topics     Smoking status: Never Smoker     Smokeless tobacco: Never Used     Alcohol use No     Family History   Problem Relation Age of Onset     Hypertension Mother      Cerebrovascular Disease Mother      Asthma No family hx of      C.A.D. No family hx of      Diabetes No family hx of      Breast Cancer No family hx of      Cancer - colorectal No family hx of      Prostate Cancer No family hx of          Current Outpatient Prescriptions   Medication Sig Dispense Refill     cyclobenzaprine (FLEXERIL) 5 MG tablet Take 1 tablet (5 mg) by mouth 3 times daily as needed for muscle spasms 30 tablet 0     naproxen (NAPROSYN) 500 MG tablet Take 1 tablet (500 mg) by mouth 2 times daily as needed for moderate pain (Patient not taking: Reported on 8/6/2018) 20 tablet 0     No Known Allergies    Reviewed and updated as needed this visit by clinical staff  Tobacco  Allergies  Meds  Med Hx  Surg Hx  Fam Hx  Soc Hx      Reviewed and updated as needed this visit by Provider         ROS:  Constitutional, HEENT, cardiovascular, pulmonary, GI, , musculoskeletal, neuro, skin, endocrine and psych systems are negative, except as otherwise noted.    OBJECTIVE:     /90  Pulse 63  Temp 97.4  F (36.3  C) (Oral)  Resp 16  Ht 5' 7\" (1.702 m)  Wt 152 lb (68.9 kg)  SpO2 100%  BMI 23.81 kg/m2  Body mass index is 23.81 kg/(m^2).  GENERAL: healthy, alert and no distress  NECK: no adenopathy, no asymmetry, masses, or scars and thyroid normal to " palpation  RESP: lungs clear to auscultation - no rales, rhonchi or wheezes  CV: regular rate and rhythm, normal S1 S2, no S3 or S4, no murmur, click or rub, no peripheral edema and peripheral pulses strong  MS: no gross musculoskeletal defects noted, no edema  SKIN: no suspicious lesions or rashes  NEURO: Normal strength and tone, mentation intact and speech normal  Comprehensive back pain exam:  Tenderness of right low back - approx 2 to right of spine in lumbar region and spans 2-3 in vertically. No tenderness over the spine., Range of motion not limited by pain, Lower extremity strength functional and equal on both sides, Lower extremity reflexes within normal limits bilaterally, Lower extremity sensation normal and equal on both sides and Straight leg raise negative bilaterally  PSYCH: mentation appears normal, affect normal/bright    Diagnostic Test Results:  Xray - lumbar spine:    ASSESSMENT/PLAN:       ICD-10-CM    1. Acute right-sided low back pain without sciatica M54.5 XR Lumbar Spine 2/3 Views   2. Lumbar sprain, subsequent encounter S33.5XXD XR Lumbar Spine 2/3 Views   Sseems to be improving but continues to have focal tenderness on exam. No red flags today. Per his report, he cannot return to work unless he has no restrictions.    Ice or heat 15 minutes 4-6 times daily  Gentle stretching  Movement is key for early relief of back pain  Keep your appointments with physical therapy   Can continue to use Flexeril and Naproxen for pain and muscle spasms  Follow up in 1 week  If you develop loss of bowel or bladder, numbness in your thighs or groin, or foot drop, go to emergency department.    See Patient Instructions    The benefits, risks and potential side effects were discussed in detail. Black box warnings discussed as relevant. All patient questions were answered. The patient was instructed to follow up immediately if any adverse reactions develop.    Patient verbalizes understanding and agrees with  plan of care. Patient stable for discharge.      CR Acosat CNP  [facility]

## 2018-08-06 NOTE — PROGRESS NOTES
Please send letter:    Mr. Ferrer,  Your x-ray results are attached and are normal. Please let us know if you have any questions.  Thank you for allowing us to participate in your care.  CR Acosta CNP

## 2018-08-06 NOTE — LETTER
August 6, 2018      Stacy Ferrer  [address]  [place] [facility] [address]        To Whom It May Concern:      Stacy Ferrer  was seen today, 8/6/2018 for a low back injury. He was seen previously for this injury on 6/13/18, 6/27/18, 7/6/18, 7/16/2018. He is improving. He may return to work with the following restrictions: no lifting more than 20 lb, no repetitive movements, no twisting or bending. He is to be icing/using heat 15 minutes 4-6 times daily, gentle stretching, moving around. He is doing physical therapy and taking medication to help with pain/muscle spasms.       Sincerely,      CR Acosta CNP

## 2018-08-07 ENCOUNTER — TELEPHONE (OUTPATIENT)
Dept: FAMILY MEDICINE | Facility: CLINIC | Age: 43
End: 2018-08-07

## 2018-08-07 ENCOUNTER — THERAPY VISIT (OUTPATIENT)
Dept: PHYSICAL THERAPY | Facility: CLINIC | Age: 43
End: 2018-08-07
Payer: COMMERCIAL

## 2018-08-07 DIAGNOSIS — S33.5XXA LUMBAR SPRAIN, INITIAL ENCOUNTER: ICD-10-CM

## 2018-08-07 PROCEDURE — 97140 MANUAL THERAPY 1/> REGIONS: CPT | Mod: GP | Performed by: PHYSICAL THERAPIST

## 2018-08-07 PROCEDURE — 97530 THERAPEUTIC ACTIVITIES: CPT | Mod: GP | Performed by: PHYSICAL THERAPIST

## 2018-08-07 PROCEDURE — 97110 THERAPEUTIC EXERCISES: CPT | Mod: GP | Performed by: PHYSICAL THERAPIST

## 2018-08-07 NOTE — TELEPHONE ENCOUNTER
[place]  or Maranda Tripathi pt: Maranda Tripathi pt   First and last name : Stacy Ferrer   Reason : pt stopped by clinic. Pt  stated previous note/ letter that was  Faxed was denied and  needs to be more detailed :  Pt stated note needs to include restrictions if any, future treatment plans and list ALL future appointments - Family Practice and Physical therapy - If more clarity on note/ letter is needed,  Please contact pt's employer ( [employer])  [phone] & enter pt.'s global # [id number]-- NOTE / LETTER IS NEEDED BEFORE Friday AUGUST 10TH 2018  Provider pt. seen : Alma Duque   Phone number they can be reached at: pt. Can be reached at ( [phone]  Ok to leave a message:  Yes     Keaton Turner, Patient Rep  [facility]

## 2018-08-07 NOTE — TELEPHONE ENCOUNTER
Called employer and they received the work letter from yesterday and office note 8/6/18 sent today to them. No further action needed. Patient called and told they received everything and he will have to wait for them to review and get back to him if anything further    Cristi Bell CMA

## 2018-08-07 NOTE — TELEPHONE ENCOUNTER
Patient contacted and explained the progress note/soap note is just the office note.  Note from 8/6/18 faxed to work  Cristi Bell CMA

## 2018-08-07 NOTE — TELEPHONE ENCOUNTER
Reason for Call:  Other     Detailed comments: Would like a progress letter(soap) asap faxed to your work : [phone], last time it wasn't sent and they denied the case. Need to get this letter asap so he won't lose his job.     Call taken on 8/7/2018 at 1:39 PM by Zari Kruse

## 2018-08-07 NOTE — MR AVS SNAPSHOT
After Visit Summary   8/7/2018    Stacy Ferrer    MRN: [id number]           Patient Information     Date Of Birth          1975        Visit Information        Provider Department      8/7/2018 1:40 PM Glory Vásquez, PT [facility]        Today's Diagnoses     Lumbar sprain, initial encounter           Follow-ups after your visit        Your next 10 appointments already scheduled     Aug 16, 2018  7:40 AM CDT   [facility] Spine with Glory Vásquez PT   [facility] ([facility]  )    [address]  [place] [id number] [phone]            Aug 17, 2018  8:20 AM CDT   [facility] with CR Burns CNP   [facility]    [address] [facility] [id number] [phone]           Bring a current list of meds and any records pertaining to this visit. For Physicals, please bring immunization records and any forms needing to be filled out. Please arrive 10 minutes early to complete paperwork.              Who to contact     If you have questions or need follow up information about today's clinic visit or your schedule please contact [facility] directly at [phone].  Normal or non-critical lab and imaging results will be communicated to you by MyChart, letter or phone within 4 business days after the clinic has received the results. If you do not hear from us within 7 days, please contact the clinic through MyChart or phone. If you have a critical or abnormal lab result, we will notify you by phone as soon as possible.  Submit refill requests through [employer] or call your pharmacy and they will forward the refill request to us. Please allow 3 business days for your refill to be completed.          Additional Information About Your Visit        Care EveryWhere ID     This is your Care EveryWhere ID. This  could be used by other organizations to access your [place] medical records  [id number]         Blood Pressure from Last 3 Encounters:   08/06/18 120/90   07/16/18 117/81   07/06/18 120/80    Weight from Last 3 Encounters:   08/06/18 68.9 kg (152 lb)   07/16/18 66.7 kg (147 lb)   07/06/18 68 kg (150 lb)              We Performed the Following     [facility] PROGRESS NOTES REPORT     MANUAL THER TECH,1+REGIONS,EA 15 MIN     THERAPEUTIC ACTIVITIES     THERAPEUTIC EXERCISES        Primary Care Provider Office Phone # Fax #    Shahab Pacheco [phone]       [address]  [facility] [address]        Equal Access to Services     CHAGO YATES : Hadii aad ku hadasho Soomaali, waaxda luqadaha, qaybta kaalmada adeegyada, waxay idiin haycarlito nolan . So [facility] [phone].    ATENCIÓN: Si habla español, tiene a del castillo disposición servicios gratuitos de asistencia lingüística. [facility] [phone].    We comply with applicable federal civil rights laws and [place] laws. We do not discriminate on the basis of race, color, national origin, age, disability, sex, sexual orientation, or gender identity.            Thank you!     Thank you for choosing [facility]  for your care. Our goal is always to provide you with excellent care. Hearing back from our patients is one way we can continue to improve our services. Please take a few minutes to complete the written survey that you may receive in the mail after your visit with us. Thank you!             Your Updated Medication List - Protect others around you: Learn how to safely use, store and throw away your medicines at www.disposemymeds.org.          This list is accurate as of 8/7/18 11:59 PM.  Always use your most recent med list.                   Brand Name Dispense Instructions for use Diagnosis    cyclobenzaprine 5 MG tablet    FLEXERIL    30 tablet    Take 1 tablet (5 mg) by mouth 3 times daily as needed for  muscle spasms    Acute right-sided low back pain without sciatica       naproxen 500 MG tablet    NAPROSYN    20 tablet    Take 1 tablet (500 mg) by mouth 2 times daily as needed for moderate pain    Acute right-sided low back pain without sciatica

## 2018-08-09 NOTE — PROGRESS NOTES
Subjective:  HPI                    Objective:  System    Physical Exam    General     ROS    Assessment/Plan:    PROGRESS  REPORT    Progress reporting period is from 18 to 18.  Patient seen 3 times for treatment of LBP     SUBJECTIVE  Subjective: pt stated overall he is better but still not able to work his job.  Per the patien the MD said no lifting > 20# but his work does not want him back until he can work 100%   Current Pain level: 2/10   Initial Pain level: 3/10   Changes in function: No changes noted in function since last SOAP note      The subjective and objective information are from the last SOAP note on this patient.  The objective findings are from DOS 18.    OBJECTIVE    TROM: mid shin with flexion, min to mod loss of ext      Body Mechanics with Squat Liftin# x5 (no pain); 25# x5 (pain center LB)   -  Cues needed to correct mechanics with lifting (keeping box close to body)    ASSESSMENT/PLAN  Updated problem list and treatment plan: Diagnosis 1:  Lumbar strain/LBP  Pain -  hot/cold therapy and manual therapy  Decreased ROM/flexibility - manual therapy and therapeutic exercise  Impaired muscle performance - neuro re-education  Decreased function - therapeutic activities  Impaired posture - neuro re-education  STG/LTGs have been met or progress has been made towards goals:  None  Assessment of Progress: The patient's condition is unchanged.  Self Management Plans:  Patient has been instructed in a home treatment program.  I have re-evaluated this patient and find that the nature, scope, duration and intensity of the therapy is appropriate for the medical condition of the patient.  Stacy continues to require the following intervention to meet STG and LTG's: PT      Recommendations:  This patient would benefit from continued therapy.     Frequency: 1 X week, once daily  Duration:  for 3 more visits        Please refer to the daily flowsheet for treatment today, total treatment time and  time spent performing 1:1 timed codes.

## 2018-08-16 ENCOUNTER — THERAPY VISIT (OUTPATIENT)
Dept: PHYSICAL THERAPY | Facility: CLINIC | Age: 43
End: 2018-08-16
Payer: COMMERCIAL

## 2018-08-16 DIAGNOSIS — S33.5XXA LUMBAR SPRAIN, INITIAL ENCOUNTER: ICD-10-CM

## 2018-08-16 PROCEDURE — 97110 THERAPEUTIC EXERCISES: CPT | Mod: GP | Performed by: PHYSICAL THERAPIST

## 2018-08-16 PROCEDURE — 97140 MANUAL THERAPY 1/> REGIONS: CPT | Mod: GP | Performed by: PHYSICAL THERAPIST

## 2018-08-16 PROCEDURE — 97035 APP MDLTY 1+ULTRASOUND EA 15: CPT | Mod: GP | Performed by: PHYSICAL THERAPIST

## 2018-08-17 ENCOUNTER — OFFICE VISIT (OUTPATIENT)
Dept: FAMILY MEDICINE | Facility: CLINIC | Age: 43
End: 2018-08-17
Payer: COMMERCIAL

## 2018-08-17 VITALS
HEART RATE: 93 BPM | SYSTOLIC BLOOD PRESSURE: 125 MMHG | DIASTOLIC BLOOD PRESSURE: 87 MMHG | WEIGHT: 152 LBS | HEIGHT: 67 IN | RESPIRATION RATE: 18 BRPM | TEMPERATURE: 98.8 F | OXYGEN SATURATION: 97 % | BODY MASS INDEX: 23.86 KG/M2

## 2018-08-17 DIAGNOSIS — M54.50 ACUTE RIGHT-SIDED LOW BACK PAIN WITHOUT SCIATICA: ICD-10-CM

## 2018-08-17 DIAGNOSIS — S33.5XXA LUMBAR SPRAIN, INITIAL ENCOUNTER: Primary | ICD-10-CM

## 2018-08-17 DIAGNOSIS — J06.9 VIRAL URI WITH COUGH: ICD-10-CM

## 2018-08-17 PROCEDURE — 99214 OFFICE O/P EST MOD 30 MIN: CPT | Performed by: NURSE PRACTITIONER

## 2018-08-17 ASSESSMENT — PAIN SCALES - GENERAL: PAINLEVEL: MILD PAIN (3)

## 2018-08-17 NOTE — PATIENT INSTRUCTIONS
Ice or heat 15 minutes 4-6 times daily  Gentle stretching  Movement is key for early relief of back pain  Keep your appointments with physical therapy   If not improving in 1 week, follow up with orthopedics   Can continue to use Flexeril and Naproxen for pain and muscle spasms  Follow up in 1 week  If you develop loss of bowel or bladder, numbness in your thighs or groin, or foot drop, go to emergency department.    At [facility], we strive to deliver an exceptional experience to you, every time we see you.  If you receive a survey in the mail, please send us back your thoughts. We really do value your feedback.    Based on your medical history, these are the current health maintenance/preventive care services that you are due for (some may have been done at this visit.)  Health Maintenance Due   Topic Date Due     HIV SCREEN (SYSTEM ASSIGNED)  06/05/1992     LIPID SCREEN Q5 YR MALE (SYSTEM ASSIGNED)  06/05/2009         Suggested websites for health information:  Www.[employer] : Up to date and easily searchable information on multiple topics.  Www.medlineplus.gov : medication info, interactive tutorials, watch real surgeries online  Www.familydoctor.org : good info from the Academy of Family Physicians  Www.cdc.gov : public health info, travel advisories, epidemics (H1N1)  Www.aap.org : children's health info, normal development, vaccinations  Www.health.state.[place].us : [place] dept of health, public health issues in [place], N1N1    Your care team:                            Family Medicine Internal Medicine   MD David San MD Shantel Branch-Fleming, MD Katya Georgiev PA-C Nam Ho, MD Pediatrics   KEVIN Weems CNP Amelia Massimini APRN CNP Shaista Malik, MD Bethany Templen, MD Deborah Mielke, MD Kim Thein, APRN CNP      Clinic hours: Monday - Thursday 7 am-7 pm; Fridays 7 am-5 pm.   Urgent care: Monday - Friday 11 am-9 pm; Saturday and Sunday 9  am-5 pm.  Pharmacy : Monday -Thursday 8 am-8 pm; Friday 8 am-6 pm; Saturday and Sunday 9 am-5 pm.     Clinic: [phone]   Pharmacy: [phone]

## 2018-08-17 NOTE — LETTER
August 17, 2018      Stacy Ferrer  [address]  [place] [facility] [address]        To Whom It May Concern:    Stacy Ferrer  was seen today, 8/17/18 for a low back injury. He was seen previously for this injury on 6/13/18, 6/27/18, 7/6/18, 7/16/2018, and 8/6/18. He is improving. He may return to work with the following restrictions: no lifting more than 35 lb. He is doing physical therapy and taking medication to help with pain/muscle spasms.     Sincerely,      CR Acosta CNP

## 2018-08-17 NOTE — MR AVS SNAPSHOT
After Visit Summary   8/17/2018    Stacy Ferrer    MRN: [id number]           Patient Information     Date Of Birth          1975        Visit Information        Provider Department      8/17/2018 8:20 AM Alma Duque APRN CNP [facility]        Today's Diagnoses     Lumbar sprain, initial encounter    -  1    Acute right-sided low back pain without sciatica          Care Instructions    Ice or heat 15 minutes 4-6 times daily  Gentle stretching  Movement is key for early relief of back pain  Keep your appointments with physical therapy   If not improving in 1 week, follow up with orthopedics   Can continue to use Flexeril and Naproxen for pain and muscle spasms  Follow up in 1 week  If you develop loss of bowel or bladder, numbness in your thighs or groin, or foot drop, go to emergency department.    At [facility], we strive to deliver an exceptional experience to you, every time we see you.  If you receive a survey in the mail, please send us back your thoughts. We really do value your feedback.    Based on your medical history, these are the current health maintenance/preventive care services that you are due for (some may have been done at this visit.)  Health Maintenance Due   Topic Date Due     HIV SCREEN (SYSTEM ASSIGNED)  06/05/1992     LIPID SCREEN Q5 YR MALE (SYSTEM ASSIGNED)  06/05/2009         Suggested websites for health information:  Www.Locomizer : Up to date and easily searchable information on multiple topics.  Www.medlineplus.gov : medication info, interactive tutorials, watch real surgeries online  Www.familydoctor.org : good info from the Academy of Family Physicians  Www.cdc.gov : public health info, travel advisories, epidemics (H1N1)  Www.aap.org : children's health info, normal development, vaccinations  Www.health.state[place] : [place] dept of health, public health issues in [place], N1N1    Your care team:                             Family Medicine Internal Medicine   MD David San MD Shantel Branch-Fleming, MD Katya Georgiev PA-C Nam Ho, MD Pediatrics   KEVIN Weems, YAYA Toussaint APRN MD Elysia Kc MD Deborah Mielke, MD Kim Thein, APRN [facility]      Clinic hours: Monday - Thursday 7 am-7 pm; Fridays 7 am-5 pm.   Urgent care: Monday - Friday 11 am-9 pm; Saturday and Sunday 9 am-5 pm.  Pharmacy : Monday -Thursday 8 am-8 pm; Friday 8 am-6 pm; Saturday and Sunday 9 am-5 pm.     Clinic: [phone]   Pharmacy: [phone]            Follow-ups after your visit        Additional Services     ORTHO  REFERRAL       [facility] is referring you to the Orthopedic  Services at [facility].       The  Representative will assist you in the coordination of your Orthopedic and Musculoskeletal Care as prescribed by your physician.    The  Representative will call you within 1 business day to help schedule your appointment, or you may contact the  Representative at:    All areas ~ [phone]     Type of Referral : Spine: Lumbar  **Choose Medical Spine Specialist (unless patient was seen by a Medical Spine Specialist within the past 6 months).**  Surgical Evaluation is advised if the patient presents with one or more of the following red flags: Evidence of Spinal Tumor, Infection or Fracture, Cauda Equina Syndrome, Sudden or Progressive Weakness, Loss of Bowel or Bladder Control, or any other documented emergent neurological condition resulting from a Lumbar Spinal Condition. Medical Spine Specialist        Timeframe requested: Routine    Coverage of these services is subject to the terms and limitations of your health insurance plan.  Please call member services at your health plan with any benefit or coverage questions.      If X-rays, CT or MRI's have been performed, please contact  "the facility where they were done to arrange for , prior to your scheduled appointment.  Please bring this referral request to your appointment and present it to your specialist.                  Your next 10 appointments already scheduled     Aug 27, 2018  7:40 AM CDT   GIRISH Spine with Glory Vásquez PT   [facility] ([facility]  )    [address]  [place] [id number] [phone]              Who to contact     If you have questions or need follow up information about today's clinic visit or your schedule please contact [facility] directly at [phone].  Normal or non-critical lab and imaging results will be communicated to you by MyChart, letter or phone within 4 business days after the clinic has received the results. If you do not hear from us within 7 days, please contact the clinic through MyChart or phone. If you have a critical or abnormal lab result, we will notify you by phone as soon as possible.  Submit refill requests through [employer]book[employer] or call your pharmacy and they will forward the refill request to us. Please allow 3 business days for your refill to be completed.          Additional Information About Your Visit        Care EveryWhere ID     This is your Care EveryWhere ID. This could be used by other organizations to access your [place] medical records  [id number]        Your Vitals Were     Pulse Temperature Respirations Height Pulse Oximetry BMI (Body Mass Index)    93 98.8  F (37.1  C) (Oral) 18 5' 7\" (1.702 m) 97% 23.81 kg/m2       Blood Pressure from Last 3 Encounters:   08/17/18 125/87   08/06/18 120/90   07/16/18 117/81    Weight from Last 3 Encounters:   08/17/18 152 lb (68.9 kg)   08/06/18 152 lb (68.9 kg)   07/16/18 147 lb (66.7 kg)              We Performed the Following     ORTHO  REFERRAL        Primary Care Provider Office Phone # Fax #    Shahab Pacheco -[phone]  [facility] [address]        Equal Access to Services     CHAGO YATES : Hadii christine ku hadterrenceo Soomaali, waaxda luqadaha, qaybta kaalmada adeiandgzuleyma, waxay idiin hayteresamarco murilloenricokareem guillermo. So [facility] [phone].    ATENCIÓN: Si habla español, tiene a del castillo disposición servicios gratuitos de asistencia lingüística. Llame al [phone].    We comply with applicable federal civil rights laws and [place] laws. We do not discriminate on the basis of race, color, national origin, age, disability, sex, sexual orientation, or gender identity.            Thank you!     Thank you for choosing [facility]  for your care. Our goal is always to provide you with excellent care. Hearing back from our patients is one way we can continue to improve our services. Please take a few minutes to complete the written survey that you may receive in the mail after your visit with us. Thank you!             Your Updated Medication List - Protect others around you: Learn how to safely use, store and throw away your medicines at www.disposemymeds.org.          This list is accurate as of 8/17/18  9:07 AM.  Always use your most recent med list.                   Brand Name Dispense Instructions for use Diagnosis    cyclobenzaprine 5 MG tablet    FLEXERIL    30 tablet    Take 1 tablet (5 mg) by mouth 3 times daily as needed for muscle spasms    Acute right-sided low back pain without sciatica       IBUPROFEN PO      Take 200 mg by mouth        naproxen 500 MG tablet    NAPROSYN    20 tablet    Take 1 tablet (500 mg) by mouth 2 times daily as needed for moderate pain    Acute right-sided low back pain without sciatica

## 2018-08-17 NOTE — PROGRESS NOTES
SUBJECTIVE:   Stacy Ferrer is a 43 year old male who presents to clinic today for the following health issues:      Back Pain       Duration: ongoing        Specific cause: lifting    Description:   Location of pain: low back right  Character of pain: sharp  Pain radiation:none  New numbness or weakness in legs, not attributed to pain:  no     Intensity: mild    History:   Pain interferes with job: YES  History of back problems: no prior back problems  Any previous MRI or X-rays: Yes- at [place].  Date 8/6/18  Sees a specialist for back pain:  No  Therapies tried without relief: none    Alleviating factors:   Improved by: muscle relaxants, NSAIDs and Physical Therapy      Precipitating factors:  Worsened by: Coughing    Functional and Psychosocial Screen (TapMyBack STarT Back):      Not performed today          Accompanying Signs & Symptoms:  Risk of Fracture:  None  Risk of Cauda Equina:  None  Risk of Infection:  None  Risk of Cancer:  None  Risk of Ankylosing Spondylitis:  Onset at age <35, male, AND morning back stiffness. no         No loss of bowel or bladder. No saddle anesthesia. Doing physical therapy and feels like it's helping. No numbness, tingling or weakness. Felt like he was doing better and then got cough which makes pain worse. Going to physical therapy. Wants to increase his lifting restriction to 35 lb from 20 lb. No rash. Wants to go back to work but has to be 100%. Agreeable to seeing ortho if not better in 2 weeks but doesn't want to go right now.      Concern - Cough  Onset: 4 days ago    Description:   Yellow sputum    Intensity: severe    Progression of Symptoms:  worsening    Accompanying Signs & Symptoms:  Back pain and throat pain  Therapies Tried and outcome: None    Has associated sore throat and runny nose. No fever. No chest pain, shortness of breath, wheezing. No smoking. No interventions yet. No known exposures. No travel.      Problem list and histories reviewed & adjusted, as  "indicated.  Additional history: as documented    Patient Active Problem List   Diagnosis     Viral hepatitis B chronic (H)     CARDIOVASCULAR SCREENING; LDL GOAL LESS THAN 160     Elevated blood pressure reading without diagnosis of hypertension     Acute right-sided low back pain with right-sided sciatica     Lumbar sprain, initial encounter     Past Surgical History:   Procedure Laterality Date     NO HISTORY OF SURGERY         Social History   Substance Use Topics     Smoking status: Never Smoker     Smokeless tobacco: Never Used     Alcohol use No     Family History   Problem Relation Age of Onset     Hypertension Mother      Cerebrovascular Disease Mother      Asthma No family hx of      C.A.D. No family hx of      Diabetes No family hx of      Breast Cancer No family hx of      Cancer - colorectal No family hx of      Prostate Cancer No family hx of          Current Outpatient Prescriptions   Medication Sig Dispense Refill     cyclobenzaprine (FLEXERIL) 5 MG tablet Take 1 tablet (5 mg) by mouth 3 times daily as needed for muscle spasms 30 tablet 0     IBUPROFEN PO Take 200 mg by mouth       naproxen (NAPROSYN) 500 MG tablet Take 1 tablet (500 mg) by mouth 2 times daily as needed for moderate pain (Patient not taking: Reported on 8/6/2018) 20 tablet 0     No Known Allergies    Reviewed and updated as needed this visit by clinical staff  Tobacco  Allergies  Meds  Problems  Med Hx  Surg Hx  Fam Hx  Soc Hx        Reviewed and updated as needed this visit by Provider  Allergies  Meds  Problems         ROS:  Constitutional, HEENT, cardiovascular, pulmonary, gi and gu systems are negative, except as otherwise noted.    OBJECTIVE:     /87 (BP Location: Right arm, Patient Position: Chair, Cuff Size: Adult Regular)  Pulse 93  Temp 98.8  F (37.1  C) (Oral)  Resp 18  Ht 5' 7\" (1.702 m)  Wt 152 lb (68.9 kg)  SpO2 97%  BMI 23.81 kg/m2  Body mass index is 23.81 kg/(m^2).  GENERAL: healthy, alert and no " distress  EYES: Eyes grossly normal to inspection, PERRL and conjunctivae and sclerae normal  HENT: ear canals and TM's normal, nose and mouth without ulcers or lesions  NECK: no adenopathy, no asymmetry, masses, or scars and thyroid normal to palpation  RESP: lungs clear to auscultation - no rales, rhonchi or wheezes  CV: regular rate and rhythm, normal S1 S2, no S3 or S4, no murmur, click or rub, no peripheral edema and peripheral pulses strong  MS: no gross musculoskeletal defects noted, no edema  SKIN: no suspicious lesions or rashes  NEURO: Normal strength and tone, mentation intact and speech normal  Comprehensive back pain exam:  Tenderness of focal tenderness to right low back, Range of motion not limited by pain, Lower extremity strength functional and equal on both sides, Lower extremity reflexes within normal limits bilaterally, Lower extremity sensation normal and equal on both sides and Straight leg raise negative bilaterally  PSYCH: mentation appears normal, affect normal/bright    Diagnostic Test Results:  none     ASSESSMENT/PLAN:       ICD-10-CM    1. Lumbar sprain, initial encounter S33.5XXA ORTHO  REFERRAL   2. Acute right-sided low back pain without sciatica M54.5 ORTHO  REFERRAL   3. Viral URI with cough J06.9     B97.89      Declined ortho referral. States he will go to ortho if not better in 2 weeks. I did enter referral and asked him to consider it and he can call to schedule if he changes his mind. No red flags on exam today. Appears to be getting better with minor setback with this upper respiratory infection as coughing exacerbates his symptoms. Continues to want to go back to work but unable to do so unless he's 100% per patient report. From a medical standpoint, I continue to recommend that he is ok to go back to work (with lifting restriction for now)  Ice or heat 15 minutes 4-6 times daily  Gentle stretching  Movement is key for early relief of back pain  Keep your  appointments with physical therapy   If not improving in 1 week, follow up with orthopedics   Can continue to use Flexeril and Naproxen for pain and muscle spasms  Continue physical therapy  Follow up in 1 week  If you develop loss of bowel or bladder, numbness in your thighs or groin, or foot drop, go to emergency department.    For cough and cold symptoms:   Push fluids, rest - drink at least 64 oz water daily  Start Mucinex DM for cough  You can use a humidifier in your room  Tylenol or ibuprofen as needed for pain or fever  If worsening or not improving in 3-5 days, follow up with primary care provider, sooner if needed      See Patient Instructions    The benefits, risks and potential side effects were discussed in detail. Black box warnings discussed as relevant. All patient questions were answered. The patient was instructed to follow up immediately if any adverse reactions develop.    Patient verbalizes understanding and agrees with plan of care. Patient stable for discharge.      CR [facility]

## 2018-08-20 NOTE — PROGRESS NOTES
Subjective:  HPI                    Objective:  System    Physical Exam    General     ROS    Assessment/Plan:    PROGRESS  REPORT    Progress reporting period is from 8/7/18 to 8/16/18.       SUBJECTIVE  Subjective changes noted by patient:  Patient seen 4 times for treatment of back pain. pt feels the back is getting better; would  like to return to work to see if he can do it.  pt would like to follow up with PT in 2 weeks    Current pain level is: 2/10.     Previous pain level was: 3/10.   Changes in function:  None  Adverse reaction to treatment or activity: activity - increase pain    OBJECTIVE  Changes noted in objective findings:  Yes, patient has been given an illustrated HEP to follow    TROM: lower 1/3 of tibia with flexion, Min loss with ext (both directions painful but no leg pain).  Palpation: point tenderness R SIJ/PSIS;      Added:  US for SIJ discomfort     ASSESSMENT/PLAN  Updated problem list and treatment plan: Diagnosis 1:  Acute LBP  Pain -  hot/cold therapy, US and manual therapy  Decreased ROM/flexibility - manual therapy and therapeutic exercise  Impaired muscle performance - neuro re-education  Decreased function - therapeutic activities  Impaired posture - neuro re-education  STG/LTGs have been met or progress has been made towards goals:  None  Assessment of Progress: The patient's condition has potential to improve.  The patient's progress has slowed.  Self Management Plans:  Patient has been instructed in a home treatment program.  I have re-evaluated this patient and find that the nature, scope, duration and intensity of the therapy is appropriate for the medical condition of the patient.  Stacy continues to require the following intervention to meet STG and LTG's:  PT    Recommendations:  This patient would benefit from continued therapy.     Frequency:  1-2 X week, once daily  Duration:  for 2-4 visits        Please refer to the daily flowsheet for treatment today, total treatment time  and time spent performing 1:1 timed codes.

## 2018-08-20 NOTE — PROGRESS NOTES
Letter and this office visit note is printed and faxed to [employer] at fax # [phone].  Olivier Goldstein,  [facility]

## 2018-08-21 ENCOUNTER — OFFICE VISIT (OUTPATIENT)
Dept: FAMILY MEDICINE | Facility: CLINIC | Age: 43
End: 2018-08-21
Payer: COMMERCIAL

## 2018-08-21 ENCOUNTER — RADIANT APPOINTMENT (OUTPATIENT)
Dept: GENERAL RADIOLOGY | Facility: CLINIC | Age: 43
End: 2018-08-21
Attending: FAMILY MEDICINE
Payer: COMMERCIAL

## 2018-08-21 VITALS
TEMPERATURE: 97.5 F | SYSTOLIC BLOOD PRESSURE: 125 MMHG | OXYGEN SATURATION: 98 % | WEIGHT: 152.4 LBS | DIASTOLIC BLOOD PRESSURE: 86 MMHG | HEART RATE: 74 BPM | BODY MASS INDEX: 23.87 KG/M2

## 2018-08-21 DIAGNOSIS — R05.9 COUGH: ICD-10-CM

## 2018-08-21 DIAGNOSIS — J20.9 ACUTE BRONCHITIS, UNSPECIFIED ORGANISM: Primary | ICD-10-CM

## 2018-08-21 PROCEDURE — 99214 OFFICE O/P EST MOD 30 MIN: CPT | Performed by: FAMILY MEDICINE

## 2018-08-21 PROCEDURE — 71046 X-RAY EXAM CHEST 2 VIEWS: CPT | Mod: FY

## 2018-08-21 PROCEDURE — 70210 X-RAY EXAM OF SINUSES: CPT | Mod: FY

## 2018-08-21 RX ORDER — PENICILLIN V POTASSIUM 250 MG/1
250 TABLET, FILM COATED ORAL 3 TIMES DAILY
Qty: 21 TABLET | Refills: 0 | Status: CANCELLED | OUTPATIENT
Start: 2018-08-21 | End: 2018-08-28

## 2018-08-21 RX ORDER — AZITHROMYCIN 250 MG/1
TABLET, FILM COATED ORAL
Qty: 6 TABLET | Refills: 0 | Status: SHIPPED | OUTPATIENT
Start: 2018-08-21 | End: 2018-08-26

## 2018-08-21 RX ORDER — ALBUTEROL SULFATE 90 UG/1
2 AEROSOL, METERED RESPIRATORY (INHALATION) EVERY 4 HOURS PRN
Qty: 1 INHALER | Refills: 0 | Status: CANCELLED | OUTPATIENT
Start: 2018-08-21

## 2018-08-21 RX ORDER — PREDNISONE 20 MG/1
20 TABLET ORAL
Qty: 6 TABLET | Refills: 0 | Status: CANCELLED | OUTPATIENT
Start: 2018-08-21 | End: 2018-08-27

## 2018-08-21 ASSESSMENT — PAIN SCALES - GENERAL: PAINLEVEL: SEVERE PAIN (7)

## 2018-08-21 NOTE — MR AVS SNAPSHOT
After Visit Summary   8/21/2018    Stacy Ferrer    MRN: [id number]           Patient Information     Date Of Birth          1975        Visit Information        Provider Department      8/21/2018 8:00 AM Flavio Patel MD [facility]        Today's Diagnoses     Acute bronchitis, unspecified organism    -  1      Care Instructions    At [facility], we strive to deliver an exceptional experience to you, every time we see you.  If you receive a survey in the mail, please send us back your thoughts. We really do value your feedback.    Suggested websites for health information:  Www.Plasticell : Up to date and easily searchable information on multiple topics.  Www.medlineplus.gov : medication info, interactive tutorials, watch real surgeries online  Www.familydoctor.org : good info from the Academy of Family Physicians  Www.cdc.gov : public health info, travel advisories, epidemics (H1N1)  Www.aap.org : children's health info, normal development, vaccinations  Www.health.[facility].[place].us : [place] dept of health, public health issues in [place], N1N1    Your care team:                            Family Medicine Internal Medicine   MD David San MD Shantel Branch-Fleming, MD Katya Georgiev PA-C Megan Hill, APRN YAYA Pacheco MD Pediatrics   Peter Falcon, PALILLY Mccarty, MD Rizwana Bustos APRN CNP   MD Elysia Woodall MD Deborah Mielke, MD Kim Thein, APRN [facility]      Clinic hours: Monday - Thursday 7 am-7 pm; Fridays 7 am-5 pm.   Urgent care: Monday - Friday 11 am-9 pm; Saturday and Sunday 9 am-5 pm.  Pharmacy : Monday -Thursday 8 am-8 pm; Friday 8 am-6 pm; Saturday and Sunday 9 am-5 pm.     Clinic: [phone]   Pharmacy: [phone]              Follow-ups after your visit        Follow-up notes from your care team     Return in about 3 days (around 8/24/2018) for recheck if symptoms fail to  resolve by then.      Your next 10 appointments already scheduled     Aug 27, 2018  7:40 AM CDT   GIRISH Spine with Glory Vásquez PT   [facility] ([facility]  )    [address] [facility] [id number] [phone]            Aug 31, 2018  8:40 AM CDT   [facility] Burns C[facility]   [facility]    [address] [facility] [id number] [phone]           Bring a current list of meds and any records pertaining to this visit. For Physicals, please bring immunization records and any forms needing to be filled out. Please arrive 10 minutes early to complete paperwork.              Who to contact     If you have questions or need follow up information about today's clinic visit or your schedule please contact [facility] directly at [phone].  Normal or non-critical lab and imaging results will be communicated to you by MyChart, letter or phone within 4 business days after the clinic has received the results. If you do not hear from us within 7 days, please contact the clinic through mygallhart or phone. If you have a critical or abnormal lab result, we will notify you by phone as soon as possible.  Submit refill requests through [employer] or call your pharmacy and they will forward the refill request to us. Please allow 3 business days for your refill to be completed.          Additional Information About Your Visit        Care EveryWhere ID     This is your Care EveryWhere ID. This could be used by other organizations to access your [place] medical records  [id number]        Your Vitals Were     Pulse Temperature Pulse Oximetry BMI (Body Mass Index)          74 97.5  F (36.4  C) (Oral) 98% 23.87 kg/m2         Blood Pressure from Last 3 Encounters:   08/21/18 125/86   08/17/18 125/87   08/06/18 120/90    Weight from Last 3 Encounters:   08/21/18 69.1 kg (152 lb  6.4 oz)   08/17/18 68.9 kg (152 lb)   08/06/18 68.9 kg (152 lb)                 Today's Medication Changes          These changes are accurate as of 8/21/18  9:16 AM.  If you have any questions, ask your nurse or doctor.               Start taking these medicines.        Dose/Directions    azithromycin 250 MG tablet   Commonly known as:  ZITHROMAX   Used for:  Acute bronchitis, unspecified organism   Started by:  Flavio Patel MD        2 tablets on the first day, then 1 tablet daily for 4 more days.   Quantity:  6 tablet   Refills:  0            Where to get your medicines      These medications [place] [facility] [place] [address]  [address], [facility] [address]     Phone:  [phone]     azithromycin 250 MG tablet                Primary Care Provider Office Phone # Fax #    Shahab Pacheco -[phone]       [address]  [facility] [address]        Equal Access to Services     [facility]: Hadii aad ku hadasho Soomaali, waaxda luqadaha, qaybta kaalmada adeegyada, waxay idiin haycarlito vazquez kharakareem nolan . So [facility] [phone].    ATENCIÓN: Si habla español, tiene a del castillo disposición servicios gratuitos de asistencia lingüística. Llame al [phone].    We comply with applicable federal civil rights laws and [place] laws. We do not discriminate on the basis of race, color, national origin, age, disability, sex, sexual orientation, or gender identity.            Thank you!     Thank you for choosing [facility]  for your care. Our goal is always to provide you with excellent care. Hearing back from our patients is one way we can continue to improve our services. Please take a few minutes to complete the written survey that you may receive in the mail after your visit with us. Thank you!             Your Updated Medication List - Protect others around you: Learn how to safely use, store and throw away your  medicines at www.disposemymeds.org.          This list is accurate as of 8/21/18  9:16 AM.  Always use your most recent med list.                   Brand Name Dispense Instructions for use Diagnosis    azithromycin 250 MG tablet    ZITHROMAX    6 tablet    2 tablets on the first day, then 1 tablet daily for 4 more days.    Acute bronchitis, unspecified organism       cyclobenzaprine 5 MG tablet    FLEXERIL    30 tablet    Take 1 tablet (5 mg) by mouth 3 times daily as needed for muscle spasms    Acute right-sided low back pain without sciatica       IBUPROFEN PO      Take 200 mg by mouth        naproxen 500 MG tablet    NAPROSYN    20 tablet    Take 1 tablet (500 mg) by mouth 2 times daily as needed for moderate pain    Acute right-sided low back pain without sciatica

## 2018-08-21 NOTE — PATIENT INSTRUCTIONS
At [facility], we strive to deliver an exceptional experience to you, every time we see you.  If you receive a survey in the mail, please send us back your thoughts. We really do value your feedback.    Suggested websites for health information:  Www.[employer].org : Up to date and easily searchable information on multiple topics.  Www.medlineplus.gov : medication info, interactive tutorials, watch real surgeries online  Www.familydoctor.org : good info from the Academy of Family Physicians  Www.cdc.gov : public health info, travel advisories, epidemics (H1N1)  Www.aap.org : children's health info, normal development, vaccinations  Www.health.Pending sale to [facility].[place].us : [place] dept of health, public health issues in [place], N1N1    Your care team:                            Family Medicine Internal Medicine   MD David San MD Shantel Branch-Fleming, MD Katya Georgiev PA-C Megan Hill, APRN CNP    Shahab Pacheco MD Pediatrics   Peter Falcon, PALILLY Mccarty, CNP MD Rizwana Mcdonnell APRN CNP   MD Elysia Woodall MD Deborah Mielke, MD Kim Thein, APRN CNP      Clinic hours: Monday - Thursday 7 am-7 pm; Fridays 7 am-5 pm.   Urgent care: Monday - Friday 11 am-9 pm; Saturday and Sunday 9 am-5 pm.  Pharmacy : Monday -Thursday 8 am-8 pm; Friday 8 am-6 pm; Saturday and Sunday 9 am-5 pm.     Clinic: [phone]   Pharmacy: [phone]

## 2018-08-23 ENCOUNTER — TELEPHONE (OUTPATIENT)
Dept: FAMILY MEDICINE | Facility: CLINIC | Age: 43
End: 2018-08-23

## 2018-08-23 NOTE — TELEPHONE ENCOUNTER
Everything should have been updated with patient's employer and Eve since office visit 8/17/18. Patient last seen 8/21/18 with another provider for other unrelated.    This writer attempted to contact Osman 08/23/18  Reason for call treatment plan and left detailed message.  If patient calls back:   [facility] 1st floor MA team        Cristi Bell

## 2018-08-23 NOTE — TELEPHONE ENCOUNTER
Reason for Call:  Other     Detailed comments: Renea is calling to clarify recent treatment plan.    Phone Number Patient can be reached at: Other phone number:  [phone]    Best Time: any    Can we leave a detailed message on this number? YES    Call taken on 8/23/2018 at 3:33 PM by Aminta Garcia

## 2018-08-31 ENCOUNTER — OFFICE VISIT (OUTPATIENT)
Dept: FAMILY MEDICINE | Facility: CLINIC | Age: 43
End: 2018-08-31
Payer: COMMERCIAL

## 2018-08-31 VITALS
HEIGHT: 67 IN | SYSTOLIC BLOOD PRESSURE: 135 MMHG | OXYGEN SATURATION: 99 % | DIASTOLIC BLOOD PRESSURE: 93 MMHG | RESPIRATION RATE: 12 BRPM | BODY MASS INDEX: 23.86 KG/M2 | TEMPERATURE: 97.6 F | HEART RATE: 92 BPM | WEIGHT: 152 LBS

## 2018-08-31 DIAGNOSIS — J20.9 ACUTE BRONCHITIS WITH SYMPTOMS GREATER THAN 10 DAYS: ICD-10-CM

## 2018-08-31 DIAGNOSIS — J01.90 ACUTE SINUSITIS WITH SYMPTOMS > 10 DAYS: ICD-10-CM

## 2018-08-31 DIAGNOSIS — S33.5XXA LUMBAR SPRAIN, INITIAL ENCOUNTER: Primary | ICD-10-CM

## 2018-08-31 PROCEDURE — 99214 OFFICE O/P EST MOD 30 MIN: CPT | Performed by: NURSE PRACTITIONER

## 2018-08-31 RX ORDER — DOXYCYCLINE 100 MG/1
100 CAPSULE ORAL 2 TIMES DAILY
Qty: 20 CAPSULE | Refills: 0 | Status: SHIPPED | OUTPATIENT
Start: 2018-08-31

## 2018-08-31 ASSESSMENT — PAIN SCALES - GENERAL: PAINLEVEL: MILD PAIN (2)

## 2018-08-31 NOTE — MR AVS SNAPSHOT
After Visit Summary   8/31/2018    Stacy Ferrer    MRN: [id number]           Patient Information     Date Of Birth          1975        Visit Information        Provider Department      8/31/2018 8:40 AM Alma Duque APRN CNP [facility]        Today's Diagnoses     Lumbar sprain, initial encounter    -  1    Acute sinusitis with symptoms > 10 days        Acute bronchitis with symptoms greater than 10 days          Care Instructions    Cough:  Push fluids - drink at least 64 oz water daily  Start antibiotic today. Know that your cough may not be completely gone when you're done with your antibiotics  Continue Mucinex DM for cough  You can use a humidifier by your bed at night. Distilled water should be used with the humidifier.  Tylenol or ibuprofen as needed for pain or fever  Follow up if you are worsening or not improving in 5 days, sooner if needed    Back:  Ice or heat 15 minutes 4-6 times daily  Gentle stretching  Movement is key for early relief of back pain  Keep your appointments with physical therapy   If not improving in 1 week, follow up with orthopedics   Can continue to use Flexeril and Naproxen for pain and muscle spasms  Follow up in 1 week  If you develop loss of bowel or bladder, numbness in your thighs or groin, or foot drop, go to emergency department.    At [facility], we strive to deliver an exceptional experience to you, every time we see you.  If you receive a survey in the mail, please send us back your thoughts. We really do value your feedback.    Based on your medical history, these are the current health maintenance/preventive care services that you are due for (some may have been done at this visit.)  Health Maintenance Due   Topic Date Due     HIV SCREEN (SYSTEM ASSIGNED)  06/05/1992     LIPID SCREEN Q5 YR MALE (SYSTEM ASSIGNED)  06/05/2009         Suggested websites for health information:  Www.Critical access hospitalMegathread.org : Up to date  and easily searchable information on multiple topics.  Www.medlineplus.gov : medication info, interactive tutorials, watch real surgeries online  Www.familydoctor.org : good info from the Academy of Family Physicians  Www.cdc.gov : public health info, travel advisories, epidemics (H1N1)  Www.aap.org : children's health info, normal development, vaccinations  Www.health.[facility].mn.us : [place] dept of health, public health issues in [place], N1N1    Your care team:                            Family Medicine Internal Medicine   MD David San MD Shantel Branch-Fleming, MD Katya Georgiev PA-C Nam Ho, MD Pediatrics   KEVIN Weems, YAYA Toussaint APRN CNP   MD Elysia Woodall MD Deborah Mielke, MD Kim Thein, APRN CNP      Clinic hours: Monday - Thursday 7 am-7 pm; Fridays 7 am-5 pm.   Urgent care: Monday - Friday 11 am-9 pm; Saturday and Sunday 9 am-5 pm.  Pharmacy : Monday -Thursday 8 am-8 pm; Friday 8 am-6 pm; Saturday and Sunday 9 am-5 pm.     Clinic: [phone]   Pharmacy: [phone]    Viêm Xoang M?i (?i?u Tr? B?ng Thu?c Tr? Sinh)    Các xoang là các phòng ch?a ??y không khí ? bên cassidy x??ng c?a m?t. Chúng n?i v?i ph?n bên cassidy c?a m?i. Viêm xoang m?i là s?ng l?p mô lót cassidy h?c xoang m?i. Viêm xoang m?i có th? x?y ra cassidy th?i lili b? c?m l?nh. Nó c?ng có th? do d? ?ng ??i v?i ph?n alia và các h?t nh? li ti khác cassidy không khí. Viêm xoang m?i có th? gây ra các tri?u ch?ng ngh?t và phù xoang m?i. Vera?m trùng xoang m?i gây s?t, nh?c ??u và ?au m?t. Th??ng thì ch?t d?ch ti?t ra có màu xanh lá cây ho?c màu vàng t? m?i ho?c ch?y vào phía judith c? h?ng (ch?y n??c m?i vào phía judith c?). Quý v? ???c cho dùng thu?c tr? sinh ?? ?i?u tr? cho tình tr?ng này.  Ch?m sóc t?i lux    Almas tr?n quá trình ?i?u tr? b?ng thu?c tr? sinh almas ch? d?n. Không ng?ng dùng thu?c, m?c dù quý v? c?m th?y ?? h?n.    U?ng th?t vera?u n??c, trà nóng, và các ch?t l?ng khác. ?i?u  này có th? giúp cho ch?t nhày ???c loãng ra. Nó c?ng có th? làm cho ti?t d?ch vera?u h?n t? xoang m?i.    Vera?t có th? giúp làm d?u nh?ng vùng b? ?au trên m?t. Dùng m?t kh?n cervantes nhúng vào n??c nóng. Ho?c, ??ng cassidy vòi sen và x?t th?ng n??c nóng vào m?t c?a quý v?. Dùng m?t máy b?c h?i n??c có thêm ch?t b?c hà cassidy ?ó vào ban ?êm c?ng có th? h?u ích.     Thu?c long ??m có ch?t guaifenesin có th? giúp làm loãng ch?t nhày và lux t?ng s? ti?t d?ch t? các xoang m?i.    Có th? dùng thu?c ch?ng ngh?t m?i rafael t? do ngoài qu?y tr? khi m?t lo?i thu?c t??ng t? ?ã ???c kê toa. Thu?c x?t m?i có tác d?ng nhanh nh?t. Dùng m?t cassidy các thu?c có ch?a phenylephrine ho?c oxymetazoline. Tr??c tiên h? m?i m?t cách nh? nhàng. Gely ?ó dùng thu?c x?t. Không dùng thu?c này th??ng h?n là ?ã ???c c?n d?n trên nhãn hi?u n?u không các tri?u ch?ng có th? b? tr?m tr?ng h?n. Quý v? c?ng có th? dùng thu?c viên nén có ch?t pseudoephedrine. Tránh dùng các s?n ph?m k?t h?p các thành ph?n nguyên li?u, vì các ph?n ?ng ph? có th? lux t?ng. ??c các nhãn hi?u. Quý v? c?ng có th? yêu c?u d??c s? giúp ??. (GHI CHÚ: Nh?ng ng??i b? áp jeff?t golden không nên dùng thu?c ch?ng ngh?t m?i. Chúng có th? làm lux t?ng áp jeff?t.)    Thu?c ch?ng histamine rafael t? do ngoài qu?y có th? h?u ích n?u b? d? ?ng kèm jamee v?i ch?ng viêm xoang m?i c?a quý v?.      Không dùng thu?c x?i ho?c r?a thông m?i cassidy khi b? vera?m trùng xoang m?i c?p tính, tr? khi ???c c?n d?n b?i nhân viên y t? c?a quý v?. Vi?c x?i r?a có th? làm cho vera?m trùng cherrie sang các xoang m?i khác.    Dùng acetaminophen ho?c ibuprofen ?? ki?m ch? c?n ?au, tr? khi thu?c gi?m ?au khác ???c kê toa. (N?u quý v? b? b?nh leah ho?c th?n mãn tính ho?c ?ã t?ng b? loét meng t?, hãy bàn v?i bác s? c?a quý v? tr??c khi dùng các thu?c này. Không meng gi? ???c ??a aspirin cho b?t c? ai d??i 17 tu?i b? b?nh có lên c?n s?t. Nó có th? làm leah b? t?n h?i nghiêm tr?ng.)    Không hút thu?c lá. ?i?u này có th? làm các tri?u ch?ng b?  "tr?m tr?ng thêm.  Ch?m sóc jamee dõi  Khám jamee dõi v?i nhân viên y t? c?a quý v? ho?c nhân viên c?a chúng tôi n?u quý v? không ?? h?n cassidy tu?n t?i.  Khi nào ?i tìm s? khuyên nh? v? y t?  G?i nhân viên y t? n?u quý v? b? b?t c? nh?ng ?i?u nào judith ?ây x?y ra:    ?au m?t ho?c nh?c ??u b? n?ng h?n    C? c?ng    Bu?n ng?, ho?c l?n l?n khác th??ng    S?ng ? trán ho?c mí m?t    Các tr? ng?i v? th? giác, meng g?m vi?c nhìn th?y m? ho?c hình ?ôi    S?t t? 100.4 F (38 C) tr? lên, ho?c jamee ch? d?n c?a nhân viên y t?    Kinh gi?t    Các tr?c tr?c v? hít th?    Các tri?u ch?ng không gi?i leticia?t ???c casisdy vòng 10 ngày  Date Last Reviewed: 4/13/2015 2000-2017 [employer]. [address] [place] [address]. All rights reserved. This information is not intended as a substitute for professional medical care. Always follow your healthcare professional's instructions.        Viêm Ph? Qu?n [Bronchitis, Adult: Abx Tx]  VIÊM PH? QU?N (BRONCHITIS) là vera?m trùng ???ng khí [ ?ng ph? qu?n  (\"bronchial tubes\")]. Vi?c này th??ng x?y ra khi c?m l?nh thông th??ng. Các tri?u ch?ng g?m ho có ??m [ (phlegm)] và s?t nh?. Viêm ph? qu?n th??ng kéo dài judith 7 ??n 14 ngày. Các tr??ng h?p nh? có th? ?i?u tr? b?ng các bi?n pháp ch?a tr? ??n gi?n t?i lux . Vera?m trùng n?ng h?n ???c ?i?u tr? b?ng tr? sinh.    Ch?m Sóc T?i Lux :  1) N?u có các tri?u ch?ng nghiêm tr?ng, hãy ngh? ng ?i t?i nhà cassidy 2 ??n 3 ngày ??u. Khi qu ý v? ho?t ??ng tr? l?i, ??ng ?? cho quá m?t.  2) ??ng hút thu?c lá. Tránh ti?p xúc v?i khói thu?c c?a ng??i khác.  3) Quý v? có th? dùng acetaminophen (Tylenol) ho?c ibuprofen (Motrin, Advil) ?? ki?m ch? c?n s?t hay ?au, tr? khi ???c ch? ??nh m?t lo?i thu?c khác ?? ch?a tr?. [L?U Ý: N?u quý v? b? b?nh leah ho?c th?n mãn tính ho?c t?ng b? loét meng t? (stomach ulcer) ho?c hayes?t jeff?t ???ng tiêu hóa (GI bleeding), h ãy bàn v?i bác s? c?a quý v? tr ??c khi dùng các lo?i thu?c này.]  4) Quý v? có th? b? kém ?n , do ?ó m?t " "ch? ?? ?n nh? là t?t. Tránh ??ng cho m?t n??c cassidy c? th? b?ng cách u?ng t? 6 ??n 8 ly ch?t l?ng m?i ngày (n??c, n??c ng?t, n??c ép trái cây, trà, súp v.v ). Ch?t l?ng s? giúp làm l?ng ch?t bài ti?t cassidy ph?i.  5) Thu?c ho bán không c?n toa có ch?a \"dextromethorphan\" (nh? Robitussin DM) và thu?c thông m?i (decongestants) (Actifed ho?c Sudafed) có th? giúp gi?m c?n ho ho?c ngh?t m?i. [L?U Ý: ??ng d ùng thu?c thông m?i n?u quý v? b? golden jeff?t áp.]  6) U?ng h?t thu?c tr? sinh cho d?u quý v? c?m th?y ?? h ?n judith vài ngày.  Ti?p T?c Almas Dõi  v?i bác s? c?a quý v? ho?c almas nh?ng gì ? ã ???c h??ng d?n n?u không th?y ?? h?n judith ba ngày.  [L?U Ý: N?u quý v? t? 64 tu?i tr? lên, ho?c n?u quý v? b? ronak?n mãn tính (chronic asthma) hay b? b?nh ngh?n ph?i mãn tính (COPD), chúng tôi ?? ngh? quý v? hãy CH?NG NG?A VIÊM PH?I ( PNEUMOCOCCAL VACCINATION) m?i n?m n?m và CH?NG NG?A CÚM [ INFLUENZAVACCINATION (FLU-SHOT)] hàng n?m vào mùa thu. H?i bác s? c?a quý v? v? vi?c này. N?u quý v? ? ã ch?p hình sanford bibiana?n, bác s? chuyên jose sanford bibiana?n s? xem l?i vi?c này. Quý v? s? ???c thông báo v? b?t k? khám phá m?i nào có th? ?nh h??ng ??n vi?c ch?m sóc c ?a quý v?.]  N?u x?y ra b?t c? ?i?u nào judith ?ây hãy L?P T?C ?I?U TR? Y T?:  -- S?t trên 100.4  F (38.0  C) quá ba ngày  -- Khó th?, th? khò khè ho?c ?au khi th?  -- Ho ra máu ho?c l??ng ?àm có màu lux t?ng  -- Y?u ?t, l? m?, nh?c ??u, ?au n?i m?t, ?au milagro ho?c c? b? c?ng     Date Last Reviewed: 9/13/2015 2000-2017 [employer]. [address] [place] [address], [place] [address]. All rights reserved. This information is not intended as a substitute for professional medical care. Always follow your healthcare professional's instructions.        C?ng c? ho?c kevong gân l?ng    Quý v? làm b? th??ng các c? (c?ng c?) ho?c dây ch?ng (jomar sosa) quanh vùng c?t s?ng. ?i?u này có th? x?y ra judith khi ??t ng?t xoay ng??i ho?c harpreet ng??i v?i l?c m?nh (ch?ng h?n nh? cassidy milagro n?n xe h?i), judith m?t " trevon?n ??ng v?ng v? ??n gi?n, ho?c judith khi vác m?t ?? v?t n?ng khi t? th? c? th? không ?úng. Dù cassidy tr??ng h?p nào, c? th??ng b? co c?ng và khi?n c?n ?au t?ng thêm.  Tình tr?ng bong gân ho?c c?ng c? l?ng th??ng khá h?n judith 1-2 tu?n. Tr? khi quý v? b? t?n th??ng c? th? n?ng (ví d? nh? milagro n?n xe h?i ho?c ngã), th??ng s? không ph?i ch?p X-snaford cassidy l?n ?ánh giá ban ??u v? ch?ng bong ian ho?c c?ng c? l?ng. N?u c?n ?au ti?p t?c và không có ph?n ?ng tr??c vi?c ?i?u tr? b?ng thu?c, ch?p X-sanford và các bài ki?m tra khác có th? ???c ti?n hành judith.  Ch?m sóc t?i lux:  1. Quý v? có th? c?n ph?i ? trên gi??ng cassidy vài ngày ??u tiên. Nh?ng hãy b?t ??u ng?i d?y ho?c ?i l?i càng s?m càng t?t ?? tránh các v?n ?? phát sinh do ngh? lâu ? trên d??ng (y?u c?, tình tr?ng c?ng và ?au l?ng t? h?n, hình thành c?c máu ?ông ? chân).  2. Khi ? trên gi??ng, c? tìm m?t v? trí tho?i mái. S? d?ng n?m c?ng là t?t nh?t. C? g?ng n?m th?ng l?ng và ?? g?i d??i ??u g?i c?a quý v?. Quý v? c?ng có th? th? n?m nghiêng ng??i, ??u g?i co v? phía ng?c và ??t g?i gi?a daniela ??u g?i.  3. Tránh ng?i lâu cassidy m?t t? th?. Làm v?y s? t?o áp l?c lên ph?n d??i l?ng alexandra?u h?n so v?i khi ??ng ho?c ?i b?.  4. Cassidy daniela ngày ??u, ch??m TÚI ?Á vào vùng ?au chadwick?ng 20 phút judith 2-4 gi?. Làm v?y s? gi?m s?ng và ?au. ALEXANDRA?T (t?m n??c nóng, ngâm n??c nóng ho?c t?m ch??m alexandra?t) có tác d?ng t?t ??i v?i tình tr?ng co c?ng c?. Quý v? có th? b?t ??u v?i ?á r?i trevon?n sang alexandra?t judith daniela ngày. M?t s? b?nh nhân c?m th?y t?t nh?t khi thay th? gi?a vi?c ?i?u tr? b?ng ?á và alexandra?t. S? d?ng ph??ng pháp nào mà quý v? c?m th?y t?t nh?t.  5. Quý v? có th? dùng acetaminophen (Tylenol) ho?c ibuprofen (Motrin, Advil) ?? ki?m soát c?n ?au, tr? khi ???c kê ??n m?t lo?i thu?c gi?m ?au khác. [L?U Ý: N?u quý v? m?c b?nh leah ho?c th?n mãn tính ho?c t?ng b? loét d? dày ho?c ch?y máu cassidy meng t? (GI), chelsey lester?n v?i bác s? c?a quý v? tr??c khi s? d?ng các lo?i thu?c này.]  6. Chú ý t?i các  ph??ng pháp nâng vác an toàn và không ???c bê ?? n?ng quá 15 pound (6,8 kg) t?i khi c?n ?au ?ã h?t.  Almas dõi  v?i bác s? c?a quý v? ho?c c? s? này n?u các tri?u ch?ng c?a quý v? không c?i thi?n judith m?t tu?n. Có th? ph?i c?n t?i v?t lý tr? li?u ho?c ki?m tra b? sung.  [L?U Ý: N?u có ch?p X-sanford, phim s? ???c xem xét b?i m?t bác s? ch?p X-sanford. Quý v? s? ???c thông báo v? m?i phát hi?n m?i mà có th? ?nh h??ng t?i vi?c ch?m sóc quý v?.]  Tìm s? rachel tâm y t? ngay l?p t?c n?u b?t k? ?i?u nào judith ?ây x?y ra:    C?n ?au tr? nên x?u ?i ho?c cherrie ra cánh glenys ho?c chân    M?t ho?c c? daniela glenys ho?c chân b? y?u ho?c tê    M?t ki?m soát bàng sanford ho?c ru?t    C?m giác tê ? háng ho?c vùng sinh d?c  Date Last Reviewed: 6/1/2016 2000-2017 [employer]. [address] [place] [address]. All rights reserved. This information is not intended as a substitute for professional medical care. Always follow your healthcare professional's instructions.                Follow-ups after your visit        Who to contact     If you have questions or need follow up information about today's clinic visit or your schedule please contact [facility] directly at [phone].  Normal or non-critical lab and imaging results will be communicated to you by MyChart, letter or phone within 4 business days after the clinic has received the results. If you do not hear from us within 7 days, please contact the clinic through mygola or phone. If you have a critical or abnormal lab result, we will notify you by phone as soon as possible.  Submit refill requests through mygola or call your pharmacy and they will forward the refill request to us. Please allow 3 business days for your refill to be completed.          Additional Information About Your Visit        Care EveryWhere ID     This is your Care EveryWhere ID. This could be used by other organizations to access your [facility] medical records  [id number]    "     Your Vitals Were     Pulse Temperature Respirations Height Pulse Oximetry BMI (Body Mass Index)    92 97.6  F (36.4  C) (Oral) 12 5' 7\" (1.702 m) 99% 23.81 kg/m2       Blood Pressure from Last 3 Encounters:   08/31/18 (!) 135/93   08/21/18 125/86   08/17/18 125/87    Weight from Last 3 Encounters:   08/31/18 152 lb (68.9 kg)   08/21/18 152 lb 6.4 oz (69.1 kg)   08/17/18 152 lb (68.9 kg)              Today, you had the following     No orders found for display         Today's Medication Changes          These changes are accurate as of 8/31/18  9:14 AM.  If you have any questions, ask your nurse or doctor.               Start taking these medicines.        Dose/Directions    doxycycline 100 MG capsule   Commonly known as:  VIBRAMYCIN   Used for:  Acute sinusitis with symptoms > 10 days, Acute bronchitis with symptoms greater than 10 days   Started by:  Alma Duque APRN CNP        Dose:  100 mg   Take 1 capsule (100 mg) by mouth 2 times daily   Quantity:  20 capsule   Refills:  0            Where to get your medicines      These medications were sent to [facility] - [place], [place] - [address]  [address], [facility] [address]     Phone:  [phone]     doxycycline 100 MG capsule                Primary Care Provider Office Phone # Fax #    Shahab Pacheco [phone]       [address]  [facility] [address]        Equal Access to Services     [facility] AH: Hadii christine house hadasho Soomaali, waaxda luqadaha, qaybta kaalmada adeegyada, johanna guillermo. So [facility] [phone].    ATENCIÓN: Si habla español, tiene a del castillo disposición servicios gratuitos de asistencia lingüística. Llame al [phone].    We comply with applicable federal civil rights laws and [place] laws. We do not discriminate on the basis of race, color, national origin, age, disability, sex, sexual orientation, or gender identity.            Thank you!     Thank " you for choosing [facility]  for your care. Our goal is always to provide you with excellent care. Hearing back from our patients is one way we can continue to improve our services. Please take a few minutes to complete the written survey that you may receive in the mail after your visit with us. Thank you!             Your Updated Medication List - Protect others around you: Learn how to safely use, store and throw away your medicines at www.disposemymeds.org.          This list is accurate as of 8/31/18  9:14 AM.  Always use your most recent med list.                   Brand Name Dispense Instructions for use Diagnosis    cyclobenzaprine 5 MG tablet    FLEXERIL    30 tablet    Take 1 tablet (5 mg) by mouth 3 times daily as needed for muscle spasms    Acute right-sided low back pain without sciatica       doxycycline 100 MG capsule    VIBRAMYCIN    20 capsule    Take 1 capsule (100 mg) by mouth 2 times daily    Acute sinusitis with symptoms > 10 days, Acute bronchitis with symptoms greater than 10 days       IBUPROFEN PO      Take 200 mg by mouth        naproxen 500 MG tablet    NAPROSYN    20 tablet    Take 1 tablet (500 mg) by mouth 2 times daily as needed for moderate pain    Acute right-sided low back pain without sciatica

## 2018-08-31 NOTE — LETTER
August 31, 2018      Stacy Ferrer  [address]  [place] [facility] [address]        To Whom It May Concern:    Stacy Ferrer was seen today, 8/31/18 for a low back injury. He was seen previously for this injury on 6/13/18, 6/27/18, 7/6/18, 7/16/2018, 8/6/18, and 8/17/2018. He is improving. He may return to work with the following restrictions: no lifting more than 35 lb.     Sincerely,        CR Acosta CNP

## 2018-08-31 NOTE — PROGRESS NOTES
This clinic note printed and faxed to [facility] at fax # [phone].  Olivier Goldstein,  [facility]

## 2018-08-31 NOTE — PATIENT INSTRUCTIONS
Cough:  Push fluids - drink at least 64 oz water daily  Start antibiotic today. Know that your cough may not be completely gone when you're done with your antibiotics  Continue Mucinex DM for cough  You can use a humidifier by your bed at night. Distilled water should be used with the humidifier.  Tylenol or ibuprofen as needed for pain or fever  Follow up if you are worsening or not improving in 5 days, sooner if needed    Back:  Ice or heat 15 minutes 4-6 times daily  Gentle stretching  Movement is key for early relief of back pain  Keep your appointments with physical therapy   If not improving in 1 week, follow up with orthopedics   Can continue to use Flexeril and Naproxen for pain and muscle spasms  Follow up in 1 week  If you develop loss of bowel or bladder, numbness in your thighs or groin, or foot drop, go to emergency department.    At [facility], we strive to deliver an exceptional experience to you, every time we see you.  If you receive a survey in the mail, please send us back your thoughts. We really do value your feedback.    Based on your medical history, these are the current health maintenance/preventive care services that you are due for (some may have been done at this visit.)  Health Maintenance Due   Topic Date Due     HIV SCREEN (SYSTEM ASSIGNED)  06/05/1992     LIPID SCREEN Q5 YR MALE (SYSTEM ASSIGNED)  06/05/2009         Suggested websites for health information:  Www.[facility] [employer] : Up to date and easily searchable information on multiple topics.  Www.medlineplus.gov : medication info, interactive tutorials, watch real surgeries online  Www.familydoctor.org : good info from the Academy of Family Physicians  Www.cdc.gov : public health info, travel advisories, epidemics (H1N1)  Www.aap.org : children's health info, normal development, vaccinations  Www.health.state.[place] : [place] dept of health, public health issues in [place], N1N1    Your care team:                             Family Medicine Internal Medicine   MD David San MD Shantel Branch-Fleming, MD Katya Georgiev PA-C Nam Ho, MD Pediatrics   EKVIN Weems, MD Elysia Myles CNP, MD Deborah Mielke, MD Kim Thein, APRBALTAZAR CNP      Clinic hours: Monday - Thursday 7 am-7 pm; Fridays 7 am-5 pm.   Urgent care: Monday - Friday 11 am-9 pm; Saturday and Sunday 9 am-5 pm.  Pharmacy : Monday -Thursday 8 am-8 pm; Friday 8 am-6 pm; Saturday and Sunday 9 am-5 pm.     Clinic: [phone]   Pharmacy: [phone]    Viêm Xoang M?i (?i?u Tr? B?ng Thu?c Tr? Sinh)    Các xoang là các phòng ch?a ??y không khí ? bên cassidy x??ng c?a m?t. Chúng n?i v?i ph?n bên cassidy c?a m?i. Viêm xoang m?i là s?ng l?p mô lót cassidy h?c xoang m?i. Viêm xoang m?i có th? x?y ra cassidy th?i lili b? c?m l?nh. Nó c?ng có th? do d? ?ng ??i v?i ph?n alia và các h?t nh? li ti khác cassidy không khí. Viêm xoang m?i có th? gây ra các tri?u ch?ng ngh?t và phù xoang m?i. Vera?m trùng xoang m?i gây s?t, nh?c ??u và ?au m?t. Th??ng thì ch?t d?ch ti?t ra có màu xanh lá cây ho?c màu vàng t? m?i ho?c ch?y vào phía judith c? h?ng (ch?y n??c m?i vào phía judith c?). Quý v? ???c cho dùng thu?c tr? sinh ?? ?i?u tr? cho tình tr?ng này.  Ch?m sóc t?i lux    Almas tr?n quá trình ?i?u tr? b?ng thu?c tr? sinh almas ch? d?n. Không ng?ng dùng thu?c, m?c dù quý v? c?m th?y ?? h?n.    U?ng th?t vera?u n??c, trà nóng, và các ch?t l?ng khác. ?i?u này có th? giúp cho ch?t nhày ???c loãng ra. Nó c?ng có th? làm cho ti?t d?ch vera?u h?n t? xoang m?i.    Vera?t có th? giúp làm d?u nh?ng vùng b? ?au trên m?t. Dùng m?t kh?n cervantes nhúng vào n??c nóng. Ho?c, ??ng cassidy vòi sen và x?t th?ng n??c nóng vào m?t c?a quý v?. Dùng m?t máy b?c h?i n??c có thêm ch?t b?c hà cassidy ?ó vào ban ?êm c?ng có th? h?u ích.     Thu?c long ??m có ch?t guaifenesin có th? giúp làm loãng ch?t nhày và lux t?ng s? ti?t d?ch t? các xoang m?i.    Có th? dùng  thu?c ch?ng ngh?t m?i rafael t? do ngoài qu?y tr? khi m?t lo?i thu?c t??ng t? ?ã ???c kê toa. Thu?c x?t m?i có tác d?ng nhanh nh?t. Dùng m?t cassidy các thu?c có ch?a phenylephrine ho?c oxymetazoline. Tr??c tiên h? m?i m?t cách nh? nhàng. Gely ?ó dùng thu?c x?t. Không dùng thu?c này th??ng h?n là ?ã ???c c?n d?n trên nhãn hi?u n?u không các tri?u ch?ng có th? b? tr?m tr?ng h?n. Quý v? c?ng có th? dùng thu?c viên nén có ch?t pseudoephedrine. Tránh dùng các s?n ph?m k?t h?p các thành ph?n nguyên li?u, vì các ph?n ?ng ph? có th? lux t?ng. ??c các nhãn hi?u. Quý v? c?ng có th? yêu c?u d??c s? giúp ??. (GHI CHÚ: Nh?ng ng??i b? áp jeff?t golden không nên dùng thu?c ch?ng ngh?t m?i. Chúng có th? làm lux t?ng áp jeff?t.)    Thu?c ch?ng histamine rafael t? do ngoài qu?y có th? h?u ích n?u b? d? ?ng kèm jamee v?i ch?ng viêm xoang m?i c?a quý v?.      Không dùng thu?c x?i ho?c r?a thông m?i cassidy khi b? lennox?m trùng xoang m?i c?p tính, tr? khi ???c c?n d?n b?i nhân viên y t? c?a quý v?. Vi?c x?i r?a có th? làm cho lennox?m trùng cherrie sang các xoang m?i khác.    Dùng acetaminophen ho?c ibuprofen ?? ki?m ch? c?n ?au, tr? khi thu?c gi?m ?au khác ???c kê toa. (N?u quý v? b? b?nh leah ho?c th?n mãn tính ho?c ?ã t?ng b? loét meng t?, hãy bàn v?i bác s? c?a quý v? tr??c khi dùng các thu?c này. Không meng gi? ???c ??a aspirin cho b?t c? ai d??i 18 tu?i b? b?nh có lên c?n s?t. Nó có th? làm leah b? t?n h?i nghiêm tr?ng.)    Không hút thu?c lá. ?i?u này có th? làm các tri?u ch?ng b? tr?m tr?ng thêm.  Ch?m sóc jamee dõi  Khám jamee dõi v?i nhân viên y t? c?a quý v? ho?c nhân viên c?a chúng tôi n?u quý v? không ?? h?n cassidy tu?n t?i.  Khi nào ?i tìm s? khuyên nh? v? y t?  G?i nhân viên y t? n?u quý v? b? b?t c? nh?ng ?i?u nào gely ?ây x?y ra:    ?au m?t ho?c nh?c ??u b? n?ng h?n    C? c?ng    Bu?n ng?, ho?c l?n l?n khác th??ng    S?ng ? trán ho?c mí m?t    Các tr? ng?i v? th? giác, meng g?m vi?c nhìn th?y m? ho?c hình ?ôi    S?t t? 100.4 F (38 C) tr? lên, ho?c jamee  "ch? d?n c?a nhân viên y t?    Kinh gi?t    Các tr?c tr?c v? hít th?    Các tri?u ch?ng không gi?i leticia?t ???c cassidy angelog 10 ngày  Date Last Reviewed: 4/13/2015 2000-2017 [employer]. [address] [place] [address], [place] [address]. All rights reserved. This information is not intended as a substitute for professional medical care. Always follow your healthcare professional's instructions.        Viêm Ph? Qu?n [Bronchitis, Adult: Abx Tx]  VIÊM PH? QU?N (BRONCHITIS) là vera?m trùng ???ng khí [ ?ng ph? qu?n  (\"bronchial tubes\")]. Vi?c này th??ng x?y ra khi c?m l?nh thông th??ng. Các tri?u ch?ng g?m ho có ??m [ (phlegm)] và s?t nh?. Viêm ph? qu?n th??ng kéo dài judith 7 ??n 14 ngày. Các tr??ng h?p nh? có th? ?i?u tr? b?ng các bi?n pháp ch?a tr? ??n gi?n t?i lux . Vera?m trùng n?ng h?n ???c ?i?u tr? b?ng tr? sinh.    Ch?m Sóc T?i Lux :  1) N?u có các tri?u ch?ng nghiêm tr?ng, hãy ngh? ng ?i t?i nhà cassidy 2 ??n 3 ngày ??u. Khi qu ý v? ho?t ??ng tr? l?i, ??ng ?? cho quá m?t.  2) ??ng hút thu?c lá. Tránh ti?p xúc v?i khói thu?c c?a ng??i khác.  3) Quý v? có th? dùng acetaminophen (Tylenol) ho?c ibuprofen (Motrin, Advil) ?? ki?m ch? c?n s?t hay ?au, tr? khi ???c ch? ??nh m?t lo?i thu?c khác ?? ch?a tr?. [L?U Ý: N?u quý v? b? b?nh leah ho?c th?n mãn tính ho?c t?ng b? loét meng t? (stomach ulcer) ho?c hayes?t jeff?t ???ng tiêu hóa (GI bleeding), h ãy bàn v?i bác s? c?a quý v? tr ??c khi dùng các lo?i thu?c này.]  4) Quý v? có th? b? kém ?n , do ?ó m?t ch? ?? ?n nh? là t?t. Tránh ??ng cho m?t n??c cassidy c? th? b?ng cách u?ng t? 6 ??n 8 ly ch?t l?ng m?i ngày (n??c, n??c ng?t, n??c ép trái cây, trà, súp v.v ). Ch?t l?ng s? giúp làm l?ng ch?t bài ti?t cassidy ph?i.  5) Thu?c ho bán không c?n toa có ch?a \"dextromethorphan\" (nh? Robitussin DM) và thu?c thông m?i (decongestants) (Actifed ho?c Sudafed) có th? giúp gi?m c?n ho ho?c ngh?t m?i. [L?U Ý: ??ng d ùng thu?c thông m?i n?u quý v? b? golden jeff?t áp.]  6) U?ng h?t thu?c tr? sinh cho " d?u quý v? c?m th?y ?? h ?n judith vài ngày.  Ti?p T?c Almas Dõi  v?i bác s? c?a quý v? ho?c almas nh?ng gì ? ã ???c h??ng d?n n?u không th?y ?? h?n judith ba ngày.  [L?U Ý: N?u quý v? t? 64 tu?i tr? lên, ho?c n?u quý v? b? ronak?n mãn tính (chronic asthma) hay b? b?nh ngh?n ph?i mãn tính (COPD), chúng tôi ?? ngh? quý v? hãy CH?NG NG?A VIÊM PH?I ( PNEUMOCOCCAL VACCINATION) m?i n?m n?m và CH?NG NG?A CÚM [ INFLUENZAVACCINATION (FLU-SHOT)] hàng n?m vào mùa thu. H?i bác s? c?a quý v? v? vi?c này. N?u quý v? ? ã ch?p hình sanford bibiana?n, bác s? chuyên jose sanford bibiana?n s? xem l?i vi?c này. Quý v? s? ???c thông báo v? b?t k? khám phá m?i nào có th? ?nh h??ng ??n vi?c ch?m sóc c ?a quý v?.]  N?u x?y ra b?t c? ?i?u nào judith ?ây hãy L?P T?C ?I?U TR? Y T?:  -- S?t trên 100.4  F (38.0  C) quá ba ngày  -- Khó th?, th? khò khè ho?c ?au khi th?  -- Ho ra máu ho?c l??ng ?àm có màu lux t?ng  -- Y?u ?t, l? m?, nh?c ??u, ?au n?i m?t, ?au milagro ho?c c? b? c?ng     Date Last Reviewed: 9/13/2015 2000-2017 [employer]. [address] [place] [address], [place] [address]. All rights reserved. This information is not intended as a substitute for professional medical care. Always follow your healthcare professional's instructions.        C?ng c? ho?c bong gân l?ng    Quý v? làm b? th??ng các c? (c?ng c?) ho?c dây ch?ng (kevong gân) quanh vùng c?t s?ng. ?i?u này có th? x?y ra judith khi ??t ng?t xoay ng??i ho?c harpreet ng??i v?i l?c m?nh (ch?ng h?n nh? cassidy milagro n?n xe h?i), judith m?t trevon?n ??ng v?ng v? ??n gi?n, ho?c judtih khi vác m?t ?? v?t n?ng khi t? th? c? th? không ?úng. Dù cassidy tr??ng h?p nào, c? th??ng b? co c?ng và khi?n c?n ?au t?ng thêm.  Tình tr?ng jomar gân ho?c c?ng c? l?ng th??ng khá h?n judith 1-2 tu?n. Tr? khi quý v? b? t?n th??ng c? th? n?ng (ví d? nh? milagro n?n xe h?i ho?c ngã), th??ng s? không ph?i ch?p X-sanford cassidy l?n ?ánh giá ban ??u v? ch?ng bong gân ho?c c?ng c? l?ng. N?u c?n ?au ti?p t?c và không có ph?n ?ng tr??c vi?c ?i?u tr? b?ng thu?c,  ch?p X-sanford và các bài ki?m tra khác có th? ???c ti?n hành judith.  Ch?m sóc t?i lux:  1. Quý v? có th? c?n ph?i ? trên gi??ng cassidy vài ngày ??u tiên. Nh?ng hãy b?t ??u ng?i d?y ho?c ?i l?i càng s?m càng t?t ?? tránh các v?n ?? phát sinh do ngh? lâu ? trên d??ng (y?u c?, tình tr?ng c?ng và ?au l?ng t? h?n, hình thành c?c máu ?ông ? chân).  2. Khi ? trên gi??ng, c? tìm m?t v? trí tho?i mái. S? d?ng n?m c?ng là t?t nh?t. C? g?ng n?m th?ng l?ng và ?? g?i d??i ??u g?i c?a quý v?. Quý v? c?ng có th? th? n?m nghiêng ng??i, ??u g?i co v? phía ng?c và ??t g?i gi?a daniela ??u g?i.  3. Tránh ng?i lâu cassidy m?t t? th?. Làm v?y s? t?o áp l?c lên ph?n d??i l?ng alexandra?u h?n so v?i khi ??ng ho?c ?i b?.  4. Cassidy daniela ngày ??u, ch??m TÚI ?Á vào vùng ?au chadwick?ng 20 phút judiht 2-4 gi?. Làm v?y s? gi?m s?ng và ?au. ALEXANDRA?T (t?m n??c nóng, ngâm n??c nóng ho?c t?m ch??m alexandra?t) có tác d?ng t?t ??i v?i tình tr?ng co c?ng c?. Quý v? có th? b?t ??u v?i ?á r?i trevon?n sang alexandra?t judith daniela ngày. M?t s? b?nh nhân c?m th?y t?t nh?t khi thay th? gi?a vi?c ?i?u tr? b?ng ?á và alexandra?t. S? d?ng ph??ng pháp nào mà quý v? c?m th?y t?t nh?t.  5. Quý v? có th? dùng acetaminophen (Tylenol) ho?c ibuprofen (Motrin, Advil) ?? ki?m soát c?n ?au, tr? khi ???c kê ??n m?t lo?i thu?c gi?m ?au khác. [L?U Ý: N?u quý v? m?c b?nh leah ho?c th?n mãn tính ho?c t?ng b? loét d? dày ho?c ch?y máu cassidy meng t? (GI), hãy nói trevon?n v?i bác s? c?a quý v? tr??c khi s? d?ng các lo?i thu?c này.]  6. Chú ý t?i các ph??ng pháp nâng vác an toàn và không ???c bê ?? n?ng quá 15 pound (6,8 kg) t?i khi c?n ?au ?ã h?t.  Almas dõi  v?i bác s? c?a quý v? ho?c c? s? này n?u các tri?u ch?ng c?a quý v? không c?i thi?n judith m?t tu?n. Có th? ph?i c?n t?i v?t lý tr? li?u ho?c ki?m tra b? sung.  [L?U Ý: N?u có ch?p X-sanford, phim s? ???c xem xét b?i m?t bác s? ch?p X-sanford. Quý v? s? ???c thông báo v? m?i phát hi?n m?i mà có th? ?nh h??ng t?i vi?c ch?m sóc quý v?.]  Tìm s? rachel tâm y t? jordanay l?p t?c n?u b?t k?  ?i?u nào judith ?ây x?y ra:    C?n ?au tr? nên x?u ?i ho?c cherrie ra cánh glenys ho?c chân    M?t ho?c c? daniela glenys ho?c chân b? y?u ho?c tê    M?t ki?m soát bàng sanford ho?c ru?t    C?m giác tê ? háng ho?c vùng sinh d?c  Date Last Reviewed: 6/1/2016 2000-2017 [employer]. [address] [place], [place] [id number]. All rights reserved. This information is not intended as a substitute for professional medical care. Always follow your healthcare professional's instructions.

## 2018-08-31 NOTE — PROGRESS NOTES
SUBJECTIVE:   Stacy Ferrer is a 43 year old male who presents to clinic today for the following health issues:      Back Pain       Duration: ongoing        Specific cause: lifting    Description:   Location of pain: low back right  Character of pain: sharp and intermittent  Pain radiation:none  New numbness or weakness in legs, not attributed to pain:  no     Intensity: mild    History:   Pain interferes with job: YES  History of back problems: no prior back problems  Any previous MRI or X-rays: Yes- at [place].  Date 2018  Sees a specialist for back pain:  No  Therapies tried without relief: none    Alleviating factors:   Improved by: muscle relaxants, NSAIDs and Physical Therapy      Precipitating factors:  Worsened by: Coughing, Bending    Functional and Psychosocial Screen (P[employer] STarT Back):      Not performed today          Accompanying Signs & Symptoms:  Risk of Fracture:  None  Risk of Cauda Equina:  None  Risk of Infection:  None  Risk of Cancer:  None  Risk of Ankylosing Spondylitis:  Onset at age <35, male, AND morning back stiffness. no       No loss of bowel or bladder. No saddle anesthesia. Hasn't been doing physical therapy because he's had upper respiratory infection x2 weeks. No numbness, tingling or weakness. Felt like he was doing better and then got cough which makes pain worse when he coughs. No rash. Wants to go back to work but has to be 100%. Doesn't want to see ortho - states they called him last time when I placed referral but he's been too sick to go in.     Cough is productive and yellow. Has a headache. No fever. No facial or teeth pain. Ears full but not painful. Sore throat resolved last week. Very tired. Took Mucinex DM and saw Dr. Patel last week who felt his illness was viral but patient pushing for antibiotics and was given azithromycin. Patient states it did not help. He had sinus and chest x-rays last week which were both normal.    Problem list and histories reviewed &  "adjusted, as indicated.  Additional history: as documented    Patient Active Problem List   Diagnosis     Viral hepatitis B chronic (H)     CARDIOVASCULAR SCREENING; LDL GOAL LESS THAN 160     Elevated blood pressure reading without diagnosis of hypertension     Acute right-sided low back pain with right-sided sciatica     Lumbar sprain, initial encounter     Past Surgical History:   Procedure Laterality Date     NO HISTORY OF SURGERY         Social History   Substance Use Topics     Smoking status: Never Smoker     Smokeless tobacco: Never Used     Alcohol use No     Family History   Problem Relation Age of Onset     Hypertension Mother      Cerebrovascular Disease Mother      Asthma No family hx of      C.A.D. No family hx of      Diabetes No family hx of      Breast Cancer No family hx of      Cancer - colorectal No family hx of      Prostate Cancer No family hx of          Current Outpatient Prescriptions   Medication Sig Dispense Refill     cyclobenzaprine (FLEXERIL) 5 MG tablet Take 1 tablet (5 mg) by mouth 3 times daily as needed for muscle spasms 30 tablet 0     IBUPROFEN PO Take 200 mg by mouth       naproxen (NAPROSYN) 500 MG tablet Take 1 tablet (500 mg) by mouth 2 times daily as needed for moderate pain 20 tablet 0     No Known Allergies    Reviewed and updated as needed this visit by clinical staff  Tobacco  Allergies  Meds  Med Hx  Surg Hx  Fam Hx  Soc Hx      Reviewed and updated as needed this visit by Provider         ROS:  Constitutional, HEENT, cardiovascular, pulmonary, gi and gu systems are negative, except as otherwise noted.    OBJECTIVE:     BP (!) 135/93  Pulse 92  Temp 97.6  F (36.4  C) (Oral)  Resp 12  Ht 5' 7\" (1.702 m)  Wt 152 lb (68.9 kg)  SpO2 99%  BMI 23.81 kg/m2  Body mass index is 23.81 kg/(m^2).  GENERAL: alert and no distress. Appears as though he doesn't feel fell. Wearing mask. Sniffling often  EYES: Eyes grossly normal to inspection, PERRL and conjunctivae and " sclerae normal  HENT: ear canals and TM's normal, nose and mouth without ulcers or lesions  NECK: no adenopathy, no asymmetry, masses, or scars and thyroid normal to palpation  RESP: lungs clear to auscultation - no rales, rhonchi or wheezes  CV: regular rate and rhythm, normal S1 S2, no S3 or S4, no murmur, click or rub, no peripheral edema and peripheral pulses strong  MS: no gross musculoskeletal defects noted, no edema. No tenderness with palpation. Has tenderness with bending over and twisting to lumbar spine.   SKIN: no rash  PSYCH: mentation appears normal, affect normal/bright    Diagnostic Test Results:  none     ASSESSMENT/PLAN:         1. Lumbar sprain, initial encounter  I do feel that he is improving. He is confident that once cough is gone he will be able to go back to work without restrictions. Will continue current restrictions for now as he hasn't been doing much with them since he's been sick with #2 and #3. I again recommend ortho referral and he continues to decline for now. The referral has been placed and they reached out to him and he didn't schedule due to current acute illness. He states when he feels a little better he will maybe call them back. He has no showed in physical therapy x2 and now is only able to do same day scheduling. I advised that he continue his current interventions and exercises.     2. Acute sinusitis with symptoms > 10 days  Had azithromycin without improvement. Will do the below and supportive interventions.  - doxycycline (VIBRAMYCIN) 100 MG capsule; Take 1 capsule (100 mg) by mouth 2 times daily  Dispense: 20 capsule; Refill: 0  Push fluids - drink at least 64 oz water daily  Start antibiotic today. Know that your cough may not be completely gone when you're done with your antibiotics  Continue Mucinex DM for cough  You can use a humidifier by your bed at night. Distilled water should be used with the humidifier.  Tylenol or ibuprofen as needed for pain or  fever  Follow up if you are worsening or not improving in 5 days, sooner if needed    3. Acute bronchitis with symptoms greater than 10 days  See #2  - doxycycline (VIBRAMYCIN) 100 MG capsule; Take 1 capsule (100 mg) by mouth 2 times daily  Dispense: 20 capsule; Refill: 0    See Patient Instructions    The benefits, risks and potential side effects were discussed in detail. Black box warnings discussed as relevant. All patient questions were answered. The patient was instructed to follow up immediately if any adverse reactions develop.    Patient verbalizes understanding and agrees with plan of care. Patient stable for discharge.      CR Acosta [facility]

## 2018-09-07 ENCOUNTER — OFFICE VISIT (OUTPATIENT)
Dept: FAMILY MEDICINE | Facility: CLINIC | Age: 43
End: 2018-09-07
Payer: COMMERCIAL

## 2018-09-07 VITALS
WEIGHT: 153 LBS | BODY MASS INDEX: 24.01 KG/M2 | TEMPERATURE: 98.2 F | DIASTOLIC BLOOD PRESSURE: 85 MMHG | HEIGHT: 67 IN | SYSTOLIC BLOOD PRESSURE: 125 MMHG | OXYGEN SATURATION: 100 % | HEART RATE: 64 BPM | RESPIRATION RATE: 12 BRPM

## 2018-09-07 DIAGNOSIS — Z02.89 ENCOUNTER FOR COMPLETION OF FORM WITH PATIENT: ICD-10-CM

## 2018-09-07 DIAGNOSIS — S33.5XXD LUMBAR SPRAIN, SUBSEQUENT ENCOUNTER: Primary | ICD-10-CM

## 2018-09-07 PROCEDURE — 99212 OFFICE O/P EST SF 10 MIN: CPT | Performed by: NURSE PRACTITIONER

## 2018-09-07 ASSESSMENT — PAIN SCALES - GENERAL: PAINLEVEL: NO PAIN (0)

## 2018-09-07 NOTE — PATIENT INSTRUCTIONS
At [facility], we strive to deliver an exceptional experience to you, every time we see you.  If you receive a survey in the mail, please send us back your thoughts. We really do value your feedback.    Based on your medical history, these are the current health maintenance/preventive care services that you are due for (some may have been done at this visit.)  Health Maintenance Due   Topic Date Due     HIV SCREEN (SYSTEM ASSIGNED)  06/05/1992     LIPID SCREEN Q5 YR MALE (SYSTEM ASSIGNED)  06/05/2009     INFLUENZA VACCINE (1) 09/01/2018         Suggested websites for health information:  Www.4Less.ThoughtSpot : Up to date and easily searchable information on multiple topics.  Www.medlineplus.gov : medication info, interactive tutorials, watch real surgeries online  Www.familydoctor.org : good info from the Academy of Family Physicians  Www.cdc.gov : public health info, travel advisories, epidemics (H1N1)  Www.aap.org : children's health info, normal development, vaccinations  Www.health.[facility].[place].us : [place] dept of health, public health issues in [place], N1N1    Your care team:                            Family Medicine Internal Medicine   MD David San MD Shantel Branch-Fleming, MD Katya Georgiev PA-C Nam Ho, MD Pediatrics   KEVIN Weems, MD Elysia Myles CNP, MD Deborah Mielke, MD Kim Thein, APRN [facility]      Clinic hours: Monday - Thursday 7 am-7 pm; Fridays 7 am-5 pm.   Urgent care: Monday - Friday 11 am-9 pm; Saturday and Sunday 9 am-5 pm.  Pharmacy : Monday -Thursday 8 am-8 pm; Friday 8 am-6 pm; Saturday and Sunday 9 am-5 pm.     Clinic: [phone]   Pharmacy: [phone]

## 2018-09-07 NOTE — MR AVS SNAPSHOT
After Visit Summary   9/7/2018    Stacy Ferrer    MRN: [id number]           Patient Information     Date Of Birth          1975        Visit Information        Provider Department      9/7/2018 8:40 AM Alma Duque APRN CNP [facility]        Today's Diagnoses     Lumbar sprain, subsequent encounter    -  1    Encounter for completion of form with patient          Care Instructions    At [facility], we strive to deliver an exceptional experience to you, every time we see you.  If you receive a survey in the mail, please send us back your thoughts. We really do value your feedback.    Based on your medical history, these are the current health maintenance/preventive care services that you are due for (some may have been done at this visit.)  Health Maintenance Due   Topic Date Due     HIV SCREEN (SYSTEM ASSIGNED)  06/05/1992     LIPID SCREEN Q5 YR MALE (SYSTEM ASSIGNED)  06/05/2009     INFLUENZA VACCINE (1) 09/01/2018         Suggested websites for health information:  Www.AdMaster : Up to date and easily searchable information on multiple topics.  Www.medlineplus.gov : medication info, interactive tutorials, watch real surgeries online  Www.familydoctor.org : good info from the Academy of Family Physicians  Www.cdc.gov : public health info, travel advisories, epidemics (H1N1)  Www.aap.org : children's health info, normal development, vaccinations  Www.health.state.[place].us : [place] dept of health, public health issues in [place], N1N1    Your care team:                            Family Medicine Internal Medicine   MD David San MD Shantel Branch-Fleming, MD Katya Georgiev PA-C Nam Ho, MD Pediatrics   KEVIN Weems CNP Amelia Massimini APRN CNP Shaista Malik, MD Bethany Templen, MD Deborah Mielke, MD Kim Thein, APRN CNP      Clinic hours: Monday - Thursday 7 am-7 pm; Fridays 7 am-5 pm.   Urgent care: Monday  "- Friday 11 am-9 pm; Saturday and Sunday 9 am-5 pm.  Pharmacy : Monday -Thursday 8 am-8 pm; Friday 8 am-6 pm; Saturday and Sunday 9 am-5 pm.     Clinic: [phone]   Pharmacy: [phone]            Follow-ups after your visit        Who to contact     If you have questions or need follow up information about today's clinic visit or your schedule please contact [facility] directly at [phone].  Normal or non-critical lab and imaging results will be communicated to you by MyChart, letter or phone within 4 business days after the clinic has received the results. If you do not hear from us within 7 days, please contact the clinic through MyChart or phone. If you have a critical or abnormal lab result, we will notify you by phone as soon as possible.  Submit refill requests through Wilocity or call your pharmacy and they will forward the refill request to us. Please allow 3 business days for your refill to be completed.          Additional Information About Your Visit        Care EveryWhere ID     This is your Care EveryWhere ID. This could be used by other organizations to access your [place] medical records  [id number]        Your Vitals Were     Pulse Temperature Respirations Height Pulse Oximetry BMI (Body Mass Index)    64 98.2  F (36.8  C) (Oral) 12 5' 7\" (1.702 m) 100% 23.96 kg/m2       Blood Pressure from Last 3 Encounters:   09/07/18 125/85   08/31/18 (!) 135/93   08/21/18 125/86    Weight from Last 3 Encounters:   09/07/18 153 lb (69.4 kg)   08/31/18 152 lb (68.9 kg)   08/21/18 152 lb 6.4 oz (69.1 kg)              Today, you had the following     No orders found for display       Primary Care Provider Office Phone # Fax #    Shahab Pacheco [phone]       [address]  [facility] [address]        Equal Access to Services     [facility] AH: Hadii christine Lizama, jelly pat, qaybta johanna mckinney. " So [facility] [phone].    ATENCIÓN: Si jaja mariscal, tiene a del castillo disposición servicios gratuitos de asistencia lingüística. Betty whalen [phone].    We comply with applicable federal civil rights laws and [place] laws. We do not discriminate on the basis of race, color, national origin, age, disability, sex, sexual orientation, or gender identity.            Thank you!     Thank you for choosing [facility]  for your care. Our goal is always to provide you with excellent care. Hearing back from our patients is one way we can continue to improve our services. Please take a few minutes to complete the written survey that you may receive in the mail after your visit with us. Thank you!             Your Updated Medication List - Protect others around you: Learn how to safely use, store and throw away your medicines at www.disposemymeds.org.          This list is accurate as of 9/7/18  9:17 AM.  Always use your most recent med list.                   Brand Name Dispense Instructions for use Diagnosis    cyclobenzaprine 5 MG tablet    FLEXERIL    30 tablet    Take 1 tablet (5 mg) by mouth 3 times daily as needed for muscle spasms    Acute right-sided low back pain without sciatica       doxycycline 100 MG capsule    VIBRAMYCIN    20 capsule    Take 1 capsule (100 mg) by mouth 2 times daily    Acute sinusitis with symptoms > 10 days, Acute bronchitis with symptoms greater than 10 days       IBUPROFEN PO      Take 200 mg by mouth        naproxen 500 MG tablet    NAPROSYN    20 tablet    Take 1 tablet (500 mg) by mouth 2 times daily as needed for moderate pain    Acute right-sided low back pain without sciatica

## 2018-09-07 NOTE — LETTER
September 7, 2018      Stacy Ferrer  [address]  [place] [facility] [address]        To Whom It May Concern:    Stacy Ferrer was seen in our clinic. He may return to work without restrictions.      Sincerely,        CR Acosta CNP

## 2018-09-07 NOTE — PROGRESS NOTES
SUBJECTIVE:   Stacy Ferrer is a 43 year old male who presents to clinic today for the following health issues:      Return to work letter  Forms    43 year old male presents for follow up of his back pain. He has not been using any medication for pain or muscle spasms. No longer has pain. No loss of bowel or bladder, no saddle anesthesia, no foot drop. He reports that symptoms have resolved completely. He would like to return to work without restrictions. He also has paperwork that needs to be filled out to do so.    Problem list and histories reviewed & adjusted, as indicated.  Additional history: as documented    Patient Active Problem List   Diagnosis     Viral hepatitis B chronic (H)     CARDIOVASCULAR SCREENING; LDL GOAL LESS THAN 160     Elevated blood pressure reading without diagnosis of hypertension     Acute right-sided low back pain with right-sided sciatica     Lumbar sprain, initial encounter     Past Surgical History:   Procedure Laterality Date     NO HISTORY OF SURGERY         Social History   Substance Use Topics     Smoking status: Never Smoker     Smokeless tobacco: Never Used     Alcohol use No     Family History   Problem Relation Age of Onset     Hypertension Mother      Cerebrovascular Disease Mother      Asthma No family hx of      C.A.D. No family hx of      Diabetes No family hx of      Breast Cancer No family hx of      Cancer - colorectal No family hx of      Prostate Cancer No family hx of          Current Outpatient Prescriptions   Medication Sig Dispense Refill     cyclobenzaprine (FLEXERIL) 5 MG tablet Take 1 tablet (5 mg) by mouth 3 times daily as needed for muscle spasms (Patient not taking: Reported on 9/7/2018) 30 tablet 0     doxycycline (VIBRAMYCIN) 100 MG capsule Take 1 capsule (100 mg) by mouth 2 times daily (Patient not taking: Reported on 9/7/2018) 20 capsule 0     IBUPROFEN PO Take 200 mg by mouth       naproxen (NAPROSYN) 500 MG tablet Take 1 tablet (500 mg) by mouth 2  "times daily as needed for moderate pain (Patient not taking: Reported on 9/7/2018) 20 tablet 0     No Known Allergies  BP Readings from Last 3 Encounters:   09/07/18 125/85   08/31/18 (!) 135/93   08/21/18 125/86    Wt Readings from Last 3 Encounters:   09/07/18 153 lb (69.4 kg)   08/31/18 152 lb (68.9 kg)   08/21/18 152 lb 6.4 oz (69.1 kg)                  Labs reviewed in EPIC    Reviewed and updated as needed this visit by clinical staff  Tobacco  Allergies  Meds  Med Hx  Surg Hx  Fam Hx  Soc Hx      Reviewed and updated as needed this visit by Provider         ROS:  Constitutional, HEENT, cardiovascular, pulmonary, gi and gu systems are negative, except as otherwise noted.    OBJECTIVE:     /85  Pulse 64  Temp 98.2  F (36.8  C) (Oral)  Resp 12  Ht 5' 7\" (1.702 m)  Wt 153 lb (69.4 kg)  SpO2 100%  BMI 23.96 kg/m2  Body mass index is 23.96 kg/(m^2).  GENERAL: healthy, alert and no distress  NECK: no adenopathy, no asymmetry, masses, or scars and thyroid normal to palpation  RESP: lungs clear to auscultation - no rales, rhonchi or wheezes  CV: regular rate and rhythm, normal S1 S2, no S3 or S4, no murmur, click or rub, no peripheral edema and peripheral pulses strong  MS: no gross musculoskeletal defects noted, no edema  SKIN: no suspicious lesions or rashes  Comprehensive back pain exam:  No tenderness, Range of motion not limited by pain, Lower extremity strength functional and equal on both sides, Lower extremity reflexes within normal limits bilaterally, Lower extremity sensation normal and equal on both sides and Straight leg raise negative bilaterally   NEURO: Normal strength and tone, mentation intact and speech normal. BUE/BLE strength 5/5, normal. DTRs 2+ throughout.  PSYCH: mentation appears normal, affect normal/bright    Diagnostic Test Results:  none     ASSESSMENT/PLAN:       ICD-10-CM    1. Lumbar sprain, subsequent encounter S33.5XXD    2. Encounter for completion of form with " patient Z02.89      Symptoms have resolved completely. Patient ok to return to work without restriction. Paperwork filled out with patient.    Patient verbalizes understanding and agrees with plan of care. Patient stable for discharge.        CR Acosta [facility]

## 2019-09-24 NOTE — MR AVS SNAPSHOT
After Visit Summary   1/23/2018    Stacy Ferrer    MRN: [id number]           Patient Information     Date Of Birth          1975        Visit Information        Provider Department      1/23/2018 2:00 PM Shahab Pacheco MD [facility]        Today's Diagnoses     Nonintractable episodic headache, unspecified headache type    -  1       Follow-ups after your visit        Additional Services     NEUROLOGY ADULT REFERRAL       Your provider has referred you for the following:   Consult at [facility]: [facility] [phone]   [email or web address]    Please be aware that coverage of these services is subject to the terms and limitations of your health insurance plan.  Call member services at your health plan with any benefit or coverage questions.      Please bring the following with you to your appointment:    (1) Any X-Rays, CTs or MRIs which have been performed.  Contact the facility where they were done to arrange for  prior to your scheduled appointment.    (2) List of current medications  (3) This referral request   (4) Any documents/labs given to you for this referral                  Your next 10 appointments already scheduled     Jan 23, 2018  2:00 PM CST   [facility] with Shahab [facility]co MD   [facility]    [address] [id number] [phone]           Bring a current list of meds and any records pertaining to this visit. For Physicals, please bring immunization records and any forms needing to be filled out. Please arrive 10 minutes early to complete paperwork.            Jan 31, 2018 10:00 AM CST   New Visit with Araceli Acosta OD   [facility]    [address] [id number] [phone]              Future tests that were ordered for you today     Open Future Orders         "Priority Expected Expires Ordered    MR Brain w/o & w Contrast Routine  2019            Who to contact     If you have questions or need follow up information about today's clinic visit or your schedule please contact [facility] [place] directly at [phone].  Normal or non-critical lab and imaging results will be communicated to you by MyChart, letter or phone within 4 business days after the clinic has received the results. If you do not hear from us within 7 days, please contact the clinic through "[employer]hart or phone. If you have a critical or abnormal lab result, we will notify you by phone as soon as possible.  Submit refill requests through Dympol or call your pharmacy and they will forward the refill request to us. Please allow 3 business days for your refill to be completed.          Additional Information About Your Visit        MyChart Information     Dympol lets you send messages to your doctor, view your test results, renew your prescriptions, schedule appointments and more. To sign up, go to [email or web address] . Click on \"Log in\" on the left side of the screen, which will take you to the Welcome page. Then click on \"Sign up Now\" on the right side of the page.     You will be asked to enter the access code listed below, as well as some personal information. Please follow the directions to create your username and password.     Your access code is: [email or web address]  Expires: 2018  1:24 PM     Your access code will  in 90 days. If you need help or a new code, please call your [place] clinic or [phone].        Care EveryWhere ID     This is your Care EveryWhere ID. This could be used by other organizations to access your [place] medical records  [id number]        Your Vitals Were     Pulse Temperature Respirations Height Pulse Oximetry BMI (Body Mass Index)    88 97.6  F (36.4  C) (Oral) 16 5' 7\" (1.702 m) 99% 23.21 kg/m2       Blood Pressure from Last 3 " Encounters:   01/23/18 126/88   03/25/17 114/78   03/19/17 115/79    Weight from Last 3 Encounters:   01/23/18 148 lb 3.2 oz (67.2 kg)   03/25/17 144 lb (65.3 kg)   03/19/17 142 lb 9.6 oz (64.7 kg)              We Performed the Following     Erythrocyte sedimentation rate auto     NEUROLOGY ADULT REFERRAL        Primary Care Provider Office Phone # Fax #    Shahab Pacheco [phone]       [address]  [facility] [address]        Equal Access to Services     [facility]: Hadii aad ku hadasho Soomaali, waaxda luqadaha, qaybta kaalmada adeegyada, johanna nolan . So [facility] [phone].    ATENCIÓN: Si habla español, tiene a del castillo disposición servicios gratuitos de asistencia lingüística. Llame al [phone].    We comply with applicable federal civil rights laws and [place] laws. We do not discriminate on the basis of race, color, national origin, age, disability, sex, sexual orientation, or gender identity.            Thank you!     Thank you for choosing [facility]  for your care. Our goal is always to provide you with excellent care. Hearing back from our patients is one way we can continue to improve our services. Please take a few minutes to complete the written survey that you may receive in the mail after your visit with us. Thank you!             Your Updated Medication List - Protect others around you: Learn how to safely use, store and throw away your medicines at www.disposemymeds.org.          This list is accurate as of: 1/23/18  1:24 PM.  Always use your most recent med list.                   Brand Name Dispense Instructions for use Diagnosis    IBUPROFEN IB OR      Take 2 tablets by mouth as needed.           no cyanosis/no pedal edema/no clubbing

## 2020-04-27 NOTE — MR AVS SNAPSHOT
After Visit Summary   8/16/2018    Stacy Ferrer    MRN: [id number]           Patient Information     Date Of Birth          1975        Visit Information        Provider Department      8/16/2018 7:40 AM Glory Vásquez, PT [facility]        Today's Diagnoses     Lumbar sprain, initial encounter           Follow-ups after your visit        Your next 10 appointments already scheduled     Aug 17, 2018  8:20 AM CDT   Office Visit with CR Burns CNP   [facility]    [address] [facility] [id number] [phone]           Bring a current list of meds and any records pertaining to this visit. For Physicals, please bring immunization records and any forms needing to be filled out. Please arrive 10 minutes early to complete paperwork.            Aug 27, 2018  7:40 AM CDT   GIRISH Spine with Glory V[facility]quez PT   [facility] ([facility]  )    [address]  [place] [id number] [phone]              Who to contact     If you have questions or need follow up information about today's clinic visit or your schedule please contact [facility] directly at [phone].  Normal or non-critical lab and imaging results will be communicated to you by MyChart, letter or phone within 4 business days after the clinic has received the results. If you do not hear from us within 7 days, please contact the clinic through MyChart or phone. If you have a critical or abnormal lab result, we will notify you by phone as soon as possible.  Submit refill requests through [employer] or call your pharmacy and they will forward the refill request to us. Please allow 3 business days for your refill to be completed.          Additional Information About Your Visit        Care EveryWhere ID     This is your Care EveryWhere ID. This  could be used by other organizations to access your [place] medical records  [id number]         Blood Pressure from Last 3 Encounters:   08/06/18 120/90   07/16/18 117/81   07/06/18 120/80    Weight from Last 3 Encounters:   08/06/18 68.9 kg (152 lb)   07/16/18 66.7 kg (147 lb)   07/06/18 68 kg (150 lb)              We Performed the Following     MANUAL THER TECH,1+REGIONS,EA 15 MIN     THERAPEUTIC EXERCISES     ULTRASOUND THERAPY        Primary Care Provider Office Phone # Fax #    Shahab Pacheco [phone]       [address]  [facility] [address]        Equal Access to Services     [facility] : Hadii aad ku hadasho [place], waaxda luqadaha, qaybta kaalmada adeegyada, johanna nolan . So [facility] [phone].    ATENCIÓN: Si habla español, tiene a del castillo disposición servicios gratuitos de asistencia lingüística. Llame al [phone].    We comply with applicable federal civil rights laws and [place] laws. We do not discriminate on the basis of race, color, national origin, age, disability, sex, sexual orientation, or gender identity.            Thank you!     Thank you for choosing [facility]  for your care. Our goal is always to provide you with excellent care. Hearing back from our patients is one way we can continue to improve our services. Please take a few minutes to complete the written survey that you may receive in the mail after your visit with us. Thank you!             Your Updated Medication List - Protect others around you: Learn how to safely use, store and throw away your medicines at www.disposemymeds.org.          This list is accurate as of 8/16/18  8:27 AM.  Always use your most recent med list.                   Brand Name Dispense Instructions for use Diagnosis    cyclobenzaprine 5 MG tablet    FLEXERIL    30 tablet    Take 1 tablet (5 mg) by mouth 3 times daily as needed for muscle spasms    Acute right-sided low  (V2) incomprehensible speech back pain without sciatica       naproxen 500 MG tablet    NAPROSYN    20 tablet    Take 1 tablet (500 mg) by mouth 2 times daily as needed for moderate pain    Acute right-sided low back pain without sciatica

## 2021-02-15 ENCOUNTER — NURSE TRIAGE (OUTPATIENT)
Dept: NURSING | Facility: CLINIC | Age: 46
End: 2021-02-15

## 2021-02-15 NOTE — TELEPHONE ENCOUNTER
Patient is concerned about possible covid-19. Patient reports that he lost his sense of smell and has a mild cough. He has no known positivie covid-19 contacts.  Patient reports that he will occasionally have a pain on the left side of his chest that comes and goes, doesn't last long and is mild, happens about twice a day. Patient denies any difficulty breathing. Denies fever.    Patient is advised on virtual visit to discuss symptoms and covid-19 testing. Patient isn't sure that he wants to be tested if there is no treatment for covid-19. Patient will think about what he wants to do and call back if he would like to schedule an appointment. He denies further questions at this time.    Maia Weldon RN  [facility]      Reason for Disposition    [1] COVID-19 infection suspected by caller or triager AND [2] mild symptoms (cough, fever, or others) AND [3] no complications or SOB    Additional Information    Negative: SEVERE difficulty breathing (e.g., struggling for each breath, speaks in single words)    Negative: Difficult to awaken or acting confused (e.g., disoriented, slurred speech)    Negative: Bluish (or gray) lips or face now    Negative: Shock suspected (e.g., cold/pale/clammy skin, too weak to stand, low BP, rapid pulse)    Negative: Sounds like a life-threatening emergency to the triager    Negative: [1] COVID-19 exposure AND [2] no symptoms    Negative: [1] Lives with someone known to have influenza (flu test positive) AND [2] flu-like symptoms (e.g., cough, runny nose, sore throat, SOB; with or without fever)    Negative: [1] Adult with possible COVID-19 symptoms AND [2] triager concerned about severity of symptoms or other causes    Negative: COVID-19 vaccine reaction suspected (e.g., fever, headache, muscle aches) occurring during days 1-3 after getting vaccine    Negative: COVID-19 vaccine, questions about    Negative: COVID-19 and breastfeeding, questions about    Negative:  SEVERE or constant chest pain or pressure (Exception: mild central chest pain, present only when coughing)    Negative: MODERATE difficulty breathing (e.g., speaks in phrases, SOB even at rest, pulse 100-120)    Negative: [1] Headache AND [2] stiff neck (can't touch chin to chest)    Negative: MILD difficulty breathing (e.g., minimal/no SOB at rest, SOB with walking, pulse <100)    Negative: Chest pain or pressure    Negative: Patient sounds very sick or weak to the triager    Negative: Fever > 103 F (39.4 C)    Negative: [1] Fever > 101 F (38.3 C) AND [2] age > 60    Negative: [1] Fever > 100.0 F (37.8 C) AND [2] bedridden (e.g., nursing home patient, CVA, chronic illness, recovering from surgery)    Negative: [1] HIGH RISK patient (e.g., age > 64 years, diabetes, heart or lung disease, weak immune system) AND [2] new or worsening symptoms    Negative: [1] HIGH RISK patient AND [2] influenza is widespread in the community AND [3] ONE OR MORE respiratory symptoms: cough, sore throat, runny or stuffy nose    Negative: [1] HIGH RISK patient AND [2] influenza exposure within the last 7 days AND [3] ONE OR MORE respiratory symptoms: cough, sore throat, runny or stuffy nose    Negative: Fever present > 3 days (72 hours)    Negative: [1] Fever returns after gone for over 24 hours AND [2] symptoms worse or not improved    Negative: [1] Continuous (nonstop) coughing interferes with work or school AND [2] no improvement using cough treatment per protocol    Protocols used: CORONAVIRUS (COVID-19) DIAGNOSED OR AFYMFUXEV-U-CF 1.3  COVID 19 Nurse Triage Plan/Patient Instructions    Please be aware that novel coronavirus (COVID-19) may be circulating in the community. If you develop symptoms such as fever, cough, or SOB or if you have concerns about the presence of another infection including coronavirus (COVID-19), please contact your health care provider or visit [email or web address].     Disposition/Instructions    Virtual Visit  with provider recommended. Reference Visit Selection Guide.    Thank you for taking steps to prevent the spread of this virus.  o Limit your contact with others.  o Wear a simple mask to cover your cough.  o Wash your hands well and often.    Resources    [facility]: About COVID-19: [email or web address]    CDC: What to Do If You're Sick: www.cdc.gov/coronavirus/2019-ncov/about/steps-when-sick.html    CDC: Ending Home Isolation: www.cdc.gov/coronavirus/2019-ncov/hcp/disposition-in-home-patients.html     CDC: Caring for Someone: www.cdc.gov/coronavirus/2019-ncov/if-you-are-sick/care-for-someone.html     [facility]: Interim Guidance for Hospital Discharge to Home: www.health.[facility].[place]./diseases/coronavirus/hcp/hospdischarge.pdf    [facility] clinical trials (COVID-19 research studies): [email or web address]-clinical-trials     Below are the COVID-19 hotlines at the [place] Department of Health ([facility]). Interpreters are available.   o For health questions: Call 272-748-6665 or 1-230.184.2059 (7 a.m. to 7 p.m.)  o For questions about schools and childcare: Call 802-603-4135 or 1-107.518.4024 (7 a.m. to 7 p.m.)

## 2021-10-20 NOTE — TELEPHONE ENCOUNTER
[address] [address]  Phone: [phone]    October 20, 2021        Ca Roblero  [address] [place] [address]          To whom it may concern:    RE: Ca Roblero    Patient was seen and treated today at our clinic.Given the positive COVID as per guidelines she will need to isolate for ten days starting from 10/16/21 to 10/25/21.   Patient may return to work 10/26/21 with the following:  No working or lifting restrictions.    Please contact me for questions or concerns.      Sincerely,        Vandana Joy MD   Last office visit dated 7/16/18 printed and faxed.  Olivier Goldstein,  [facility]

## 2022-05-18 ENCOUNTER — OFFICE VISIT (OUTPATIENT)
Dept: FAMILY MEDICINE | Facility: CLINIC | Age: 47
End: 2022-05-18
Payer: COMMERCIAL

## 2022-05-18 VITALS
SYSTOLIC BLOOD PRESSURE: 120 MMHG | BODY MASS INDEX: 23.89 KG/M2 | TEMPERATURE: 97.9 F | DIASTOLIC BLOOD PRESSURE: 82 MMHG | RESPIRATION RATE: 18 BRPM | OXYGEN SATURATION: 99 % | WEIGHT: 152.2 LBS | HEIGHT: 67 IN | HEART RATE: 67 BPM

## 2022-05-18 DIAGNOSIS — Z12.11 SCREEN FOR COLON CANCER: ICD-10-CM

## 2022-05-18 DIAGNOSIS — Z00.00 ROUTINE GENERAL MEDICAL EXAMINATION AT A HEALTH CARE FACILITY: Primary | ICD-10-CM

## 2022-05-18 DIAGNOSIS — Z13.220 SCREENING FOR HYPERLIPIDEMIA: ICD-10-CM

## 2022-05-18 LAB
ALBUMIN SERPL-MCNC: 3.8 G/DL (ref 3.4–5)
ALP SERPL-CCNC: 65 U/L (ref 40–150)
ALT SERPL W P-5'-P-CCNC: 44 U/L (ref 0–70)
ANION GAP SERPL CALCULATED.3IONS-SCNC: 5 MMOL/L (ref 3–14)
AST SERPL W P-5'-P-CCNC: 21 U/L (ref 0–45)
BASOPHILS # BLD AUTO: 0.1 10E3/UL (ref 0–0.2)
BASOPHILS NFR BLD AUTO: 1 %
BILIRUB SERPL-MCNC: 0.4 MG/DL (ref 0.2–1.3)
BUN SERPL-MCNC: 13 MG/DL (ref 7–30)
CALCIUM SERPL-MCNC: 9.1 MG/DL (ref 8.5–10.1)
CHLORIDE BLD-SCNC: 106 MMOL/L (ref 94–109)
CHOLEST SERPL-MCNC: 171 MG/DL
CO2 SERPL-SCNC: 29 MMOL/L (ref 20–32)
CREAT SERPL-MCNC: 0.78 MG/DL (ref 0.66–1.25)
CREAT UR-MCNC: 82 MG/DL
EOSINOPHIL # BLD AUTO: 0.3 10E3/UL (ref 0–0.7)
EOSINOPHIL NFR BLD AUTO: 4 %
ERYTHROCYTE [DISTWIDTH] IN BLOOD BY AUTOMATED COUNT: 11.6 % (ref 10–15)
FASTING STATUS PATIENT QL REPORTED: YES
GFR SERPL CREATININE-BSD FRML MDRD: >90 ML/MIN/1.73M2
GLUCOSE BLD-MCNC: 87 MG/DL (ref 70–99)
HBA1C MFR BLD: 5.7 % (ref 0–5.6)
HCT VFR BLD AUTO: 47.2 % (ref 40–53)
HDLC SERPL-MCNC: 30 MG/DL
HGB BLD-MCNC: 16 G/DL (ref 13.3–17.7)
IMM GRANULOCYTES # BLD: 0 10E3/UL
IMM GRANULOCYTES NFR BLD: 0 %
LDLC SERPL CALC-MCNC: 108 MG/DL
LYMPHOCYTES # BLD AUTO: 2 10E3/UL (ref 0.8–5.3)
LYMPHOCYTES NFR BLD AUTO: 21 %
MCH RBC QN AUTO: 31.6 PG (ref 26.5–33)
MCHC RBC AUTO-ENTMCNC: 33.9 G/DL (ref 31.5–36.5)
MCV RBC AUTO: 93 FL (ref 78–100)
MICROALBUMIN UR-MCNC: 7 MG/L
MICROALBUMIN/CREAT UR: 8.54 MG/G CR (ref 0–17)
MONOCYTES # BLD AUTO: 0.8 10E3/UL (ref 0–1.3)
MONOCYTES NFR BLD AUTO: 8 %
NEUTROPHILS # BLD AUTO: 6.5 10E3/UL (ref 1.6–8.3)
NEUTROPHILS NFR BLD AUTO: 67 %
NONHDLC SERPL-MCNC: 141 MG/DL
PLATELET # BLD AUTO: 229 10E3/UL (ref 150–450)
POTASSIUM BLD-SCNC: 3.9 MMOL/L (ref 3.4–5.3)
PROT SERPL-MCNC: 8 G/DL (ref 6.8–8.8)
RBC # BLD AUTO: 5.06 10E6/UL (ref 4.4–5.9)
SODIUM SERPL-SCNC: 140 MMOL/L (ref 133–144)
TRIGL SERPL-MCNC: 165 MG/DL
WBC # BLD AUTO: 9.7 10E3/UL (ref 4–11)

## 2022-05-18 PROCEDURE — 83036 HEMOGLOBIN GLYCOSYLATED A1C: CPT | Performed by: INTERNAL MEDICINE

## 2022-05-18 PROCEDURE — 80053 COMPREHEN METABOLIC PANEL: CPT | Performed by: INTERNAL MEDICINE

## 2022-05-18 PROCEDURE — 36415 COLL VENOUS BLD VENIPUNCTURE: CPT | Performed by: INTERNAL MEDICINE

## 2022-05-18 PROCEDURE — 85025 COMPLETE CBC W/AUTO DIFF WBC: CPT | Performed by: INTERNAL MEDICINE

## 2022-05-18 PROCEDURE — 99396 PREV VISIT EST AGE 40-64: CPT | Performed by: INTERNAL MEDICINE

## 2022-05-18 PROCEDURE — 80061 LIPID PANEL: CPT | Performed by: INTERNAL MEDICINE

## 2022-05-18 PROCEDURE — 82043 UR ALBUMIN QUANTITATIVE: CPT | Performed by: INTERNAL MEDICINE

## 2022-05-18 ASSESSMENT — ENCOUNTER SYMPTOMS
MYALGIAS: 0
JOINT SWELLING: 0
FEVER: 0
ABDOMINAL PAIN: 0
PARESTHESIAS: 0
PALPITATIONS: 0
COUGH: 0
DIZZINESS: 0
WEAKNESS: 1
ARTHRALGIAS: 0
CONSTIPATION: 0
SORE THROAT: 0
EYE PAIN: 0
HEADACHES: 1
DYSURIA: 0
DIARRHEA: 0
FREQUENCY: 0
HEARTBURN: 0
HEMATURIA: 0
CHILLS: 0
NAUSEA: 0
NERVOUS/ANXIOUS: 0
HEMATOCHEZIA: 0
SHORTNESS OF BREATH: 0

## 2022-05-18 ASSESSMENT — PAIN SCALES - GENERAL: PAINLEVEL: NO PAIN (0)

## 2022-05-18 NOTE — PROGRESS NOTES
Problem: At Risk for Falls  Goal: # Patient does not fall  Outcome: Outcome Met, Continue evaluating goal progress toward completion  Note: No fall this shift, needs monitored, call light within reach, bed alarm on.      SUBJECTIVE:   CC: Stacy Ferrer is an 46 year old male who presents for preventative health visit.     Patient has been advised of split billing requirements and indicates understanding: Yes  Healthy Habits:     Getting at least 3 servings of Calcium per day:  Yes    Bi-annual eye exam:  NO    Dental care twice a year:  NO    Sleep apnea or symptoms of sleep apnea:  None    Diet:  Regular (no restrictions)    Frequency of exercise:  4-5 days/week    Duration of exercise:  Less than 15 minutes    Taking medications regularly:  Yes    Barriers to taking medications:  None    Medication side effects:  None    PHQ-2 Total Score: 0    Additional concerns today:  No  Headache   Pertinent negatives include no fever, no palpitations, no shortness of breath and no nausea.     Today's PHQ-2 Score:   PHQ-2 ( 1999 Pfizer) 5/18/2022   Q1: Little interest or pleasure in doing things 0   Q2: Feeling down, depressed or hopeless 0   PHQ-2 Score 0   Q1: Little interest or pleasure in doing things Not at all   Q2: Feeling down, depressed or hopeless Not at all   PHQ-2 Score 0       Abuse: Current or Past(Physical, Sexual or Emotional)- No  Do you feel safe in your environment? No    Have you ever done Advance Care Planning? (For example, a Health Directive, POLST, or a discussion with a medical provider or your loved ones about your wishes): No, advance care planning information given to patient to review.  Patient declined advance care planning discussion at this time.    Social History     Tobacco Use     Smoking status: Never Smoker     Smokeless tobacco: Never Used   Substance Use Topics     Alcohol use: No     If you drink alcohol do you typically have >3 drinks per day or >7 drinks per week? No    Alcohol Use 5/18/2022   Prescreen: >3 drinks/day or >7 drinks/week? No   No flowsheet data found.    Last PSA: No results found for: PSA    Reviewed orders with patient. Reviewed health maintenance and updated orders accordingly -  Yes  Labs reviewed in EPIC  BP Readings from Last 3 Encounters:   05/18/22 120/82   09/07/18 125/85   08/31/18 (!) 135/93    Wt Readings from Last 3 Encounters:   05/18/22 69 kg (152 lb 3.2 oz)   09/07/18 69.4 kg (153 lb)   08/31/18 68.9 kg (152 lb)                  Patient Active Problem List   Diagnosis     Viral hepatitis B chronic (H)     CARDIOVASCULAR SCREENING; LDL GOAL LESS THAN 160     Elevated blood pressure reading without diagnosis of hypertension     Acute right-sided low back pain with right-sided sciatica     Lumbar sprain, initial encounter     Past Surgical History:   Procedure Laterality Date     NO HISTORY OF SURGERY         Social History     Tobacco Use     Smoking status: Never Smoker     Smokeless tobacco: Never Used   Substance Use Topics     Alcohol use: No     Family History   Problem Relation Age of Onset     Hypertension Mother      Cerebrovascular Disease Mother      Asthma No family hx of      C.A.D. No family hx of      Diabetes No family hx of      Breast Cancer No family hx of      Cancer - colorectal No family hx of      Prostate Cancer No family hx of          Current Outpatient Medications   Medication Sig Dispense Refill     IBUPROFEN PO Take 200 mg by mouth       cyclobenzaprine (FLEXERIL) 5 MG tablet Take 1 tablet (5 mg) by mouth 3 times daily as needed for muscle spasms (Patient not taking: No sig reported) 30 tablet 0     doxycycline (VIBRAMYCIN) 100 MG capsule Take 1 capsule (100 mg) by mouth 2 times daily (Patient not taking: No sig reported) 20 capsule 0     naproxen (NAPROSYN) 500 MG tablet Take 1 tablet (500 mg) by mouth 2 times daily as needed for moderate pain (Patient not taking: No sig reported) 20 tablet 0     No Known Allergies  Recent Labs   Lab Test 05/18/22  0955   A1C 5.7*   *   HDL 30*   TRIG 165*   ALT 44   CR 0.78   GFRESTIMATED >90   POTASSIUM 3.9        Reviewed and updated as needed this visit by clinical staff   Tobacco  Allergies  Meds   Med  "Hx   Fam Hx  Soc Hx          Reviewed and updated as needed this visit by Provider                       Review of Systems   Constitutional: Negative for chills and fever.   HENT: Negative for congestion, ear pain, hearing loss and sore throat.    Eyes: Negative for pain and visual disturbance.   Respiratory: Negative for cough and shortness of breath.    Cardiovascular: Negative for chest pain, palpitations and peripheral edema.   Gastrointestinal: Negative for abdominal pain, constipation, diarrhea, heartburn, hematochezia and nausea.   Genitourinary: Negative for dysuria, frequency, genital sores, hematuria and urgency.   Musculoskeletal: Negative for arthralgias, joint swelling and myalgias.   Skin: Negative for rash.   Neurological: Positive for weakness and headaches. Negative for dizziness and paresthesias.   Psychiatric/Behavioral: Negative for mood changes. The patient is not nervous/anxious.        OBJECTIVE:   /82   Pulse 67   Temp 97.9  F (36.6  C) (Tympanic)   Resp 18   Ht 1.702 m (5' 7\")   Wt 69 kg (152 lb 3.2 oz)   SpO2 99%   BMI 23.84 kg/m    Physical Exam  GENERAL: healthy, alert and no distress  EYES: Eyes grossly normal to inspection, PERRL and conjunctivae and sclerae normal  HENT: ear canals and TM's normal, nose and mouth without ulcers or lesions  NECK: no adenopathy, no asymmetry, masses, or scars and thyroid normal to palpation  RESP: lungs clear to auscultation - no rales, rhonchi or wheezes  CV: regular rate and rhythm, normal S1 S2, no S3 or S4, no murmur, click or rub, no peripheral edema and peripheral pulses strong  ABDOMEN: soft, nontender, no hepatosplenomegaly, no masses and bowel sounds normal  MS: no gross musculoskeletal defects noted, no edema  SKIN: no suspicious lesions or rashes  NEURO: Normal strength and tone, mentation intact and speech normal  PSYCH: mentation appears normal, affect normal/bright    Diagnostic Test Results:  Results for orders placed or " performed in visit on 05/18/22   Lipid panel reflex to direct LDL Fasting     Status: Abnormal   Result Value Ref Range    Cholesterol 171 <200 mg/dL    Triglycerides 165 (H) <150 mg/dL    Direct Measure HDL 30 (L) >=40 mg/dL    LDL Cholesterol Calculated 108 (H) <=100 mg/dL    Non HDL Cholesterol 141 (H) <130 mg/dL    Patient Fasting > 8hrs? Yes     Narrative    Cholesterol  Desirable:  <200 mg/dL    Triglycerides  Normal:  Less than 150 mg/dL  Borderline High:  150-199 mg/dL  High:  200-499 mg/dL  Very High:  Greater than or equal to 500 mg/dL    Direct Measure HDL  Female:  Greater than or equal to 50 mg/dL   Male:  Greater than or equal to 40 mg/dL    LDL Cholesterol  Desirable:  <100mg/dL  Above Desirable:  100-129 mg/dL   Borderline High:  130-159 mg/dL   High:  160-189 mg/dL   Very High:  >= 190 mg/dL    Non HDL Cholesterol  Desirable:  130 mg/dL  Above Desirable:  130-159 mg/dL  Borderline High:  160-189 mg/dL  High:  190-219 mg/dL  Very High:  Greater than or equal to 220 mg/dL   Comprehensive metabolic panel     Status: Normal   Result Value Ref Range    Sodium 140 133 - 144 mmol/L    Potassium 3.9 3.4 - 5.3 mmol/L    Chloride 106 94 - 109 mmol/L    Carbon Dioxide (CO2) 29 20 - 32 mmol/L    Anion Gap 5 3 - 14 mmol/L    Urea Nitrogen 13 7 - 30 mg/dL    Creatinine 0.78 0.66 - 1.25 mg/dL    Calcium 9.1 8.5 - 10.1 mg/dL    Glucose 87 70 - 99 mg/dL    Alkaline Phosphatase 65 40 - 150 U/L    AST 21 0 - 45 U/L    ALT 44 0 - 70 U/L    Protein Total 8.0 6.8 - 8.8 g/dL    Albumin 3.8 3.4 - 5.0 g/dL    Bilirubin Total 0.4 0.2 - 1.3 mg/dL    GFR Estimate >90 >60 mL/min/1.73m2   Albumin Random Urine Quantitative with Creat Ratio     Status: None   Result Value Ref Range    Creatinine Urine mg/dL 82 mg/dL    Albumin Urine mg/L 7 mg/L    Albumin Urine mg/g Cr 8.54 0.00 - 17.00 mg/g Cr   Hemoglobin A1c     Status: Abnormal   Result Value Ref Range    Hemoglobin A1C 5.7 (H) 0.0 - 5.6 %   CBC with platelets and differential      Status: None   Result Value Ref Range    WBC Count 9.7 4.0 - 11.0 10e3/uL    RBC Count 5.06 4.40 - 5.90 10e6/uL    Hemoglobin 16.0 13.3 - 17.7 g/dL    Hematocrit 47.2 40.0 - 53.0 %    MCV 93 78 - 100 fL    MCH 31.6 26.5 - 33.0 pg    MCHC 33.9 31.5 - 36.5 g/dL    RDW 11.6 10.0 - 15.0 %    Platelet Count 229 150 - 450 10e3/uL    % Neutrophils 67 %    % Lymphocytes 21 %    % Monocytes 8 %    % Eosinophils 4 %    % Basophils 1 %    % Immature Granulocytes 0 %    Absolute Neutrophils 6.5 1.6 - 8.3 10e3/uL    Absolute Lymphocytes 2.0 0.8 - 5.3 10e3/uL    Absolute Monocytes 0.8 0.0 - 1.3 10e3/uL    Absolute Eosinophils 0.3 0.0 - 0.7 10e3/uL    Absolute Basophils 0.1 0.0 - 0.2 10e3/uL    Absolute Immature Granulocytes 0.0 <=0.4 10e3/uL   CBC with platelets and differential     Status: None    Narrative    The following orders were created for panel order CBC with platelets and differential.  Procedure                               Abnormality         Status                     ---------                               -----------         ------                     CBC with platelets and d...[[id number]]                      Final result                 Please view results for these tests on the individual orders.       ASSESSMENT/PLAN:     Routine general medical examination at a health care facility  - REVIEW OF HEALTH MAINTENANCE PROTOCOL ORDERS  - Lipid panel reflex to direct LDL Fasting  - CBC with platelets and differential  - Comprehensive metabolic panel  - Albumin Random Urine Quantitative with Creat Ratio  - Hemoglobin A1c    Screen for colon cancer  - Fecal colorectal cancer screen (FIT); Future      Patient has been advised of split billing requirements and indicates understanding: Yes    COUNSELING:   Special attention given to:        Regular exercise       Healthy diet/nutrition       Colorectal cancer screening       The 10-year ASCVD risk score (Kimberley MONK Jr., et al., 2013) is: 3.2%    Values used to  "calculate the score:      Age: 46 years      Sex: Male      Is Non- : No      Diabetic: No      Tobacco smoker: No      Systolic Blood Pressure: 120 mmHg      Is BP treated: No      HDL Cholesterol: 30 mg/dL      Total Cholesterol: 171 mg/dL    Estimated body mass index is 23.84 kg/m  as calculated from the following:    Height as of this encounter: 1.702 m (5' 7\").    Weight as of this encounter: 69 kg (152 lb 3.2 oz).     Weight management plan noted, stable and monitoring    He reports that he has never smoked. He has never used smokeless tobacco.      Counseling Resources:  ATP IV Guidelines  Pooled Cohorts Equation Calculator  FRAX Risk Assessment  ICSI Preventive Guidelines  Dietary Guidelines for Americans, 2010  USDA's MyPlate  ASA Prophylaxis  Lung CA Screening    David Cazares MD  [facility]  "

## 2022-05-18 NOTE — LETTER
Stacy Ferrer  [address] [place] [address]        Dear Mr. Ferrer,      Here are your test results, along with result interpretation.       Occult Blood Screen FIT Negative Negative     Resulting Agency  [facility]              Specimen Collected: 05/28/22  2:11 PM Last Resulted: 05/28/22  2:44 PM         Your stool occult test is negative, which indicates low risk of colon cancer. No reason to undergo colonoscopy. Let's repeat this test next year.      Hemoglobin A1C 0.0 - 5.6 % 5.7 High      Comment: Normal <5.7%   Prediabetes 5.7-6.4%     Diabetes 6.5% or higher       Note: Adopted from ADA consensus guidelines.   Resulting Agency  [facility]              Specimen Collected: 05/18/22  9:55 AM Last Resulted: 05/18/22 10:25 AM           Screening test for diabetes show that you have prediabetes.    Creatinine Urine mg/dL mg/dL 82      Albumin Urine mg/L mg/L 7     Albumin Urine mg/g Cr 0.00 - 17.00 mg/g Cr 8.54    Resulting Agency  [facility]              Specimen Collected: 05/18/22  9:55 AM Last Resulted: 05/18/22  9:32 PM           Normal urine albumin test. It means no early sign of kidney disease.              /18/2022 12:49 PM     Component Value Flag Ref Range Units Status   Sodium 140   133 - 144 mmol/L Final   Potassium 3.9   3.4 - 5.3 mmol/L Final   Chloride 106   94 - 109 mmol/L Final   Carbon Dioxide (CO2) 29   20 - 32 mmol/L Final   Anion Gap 5   3 - 14 mmol/L Final   Urea Nitrogen 13   7 - 30 mg/dL Final   Creatinine 0.78   0.66 - 1.25 mg/dL Final   Calcium 9.1   8.5 - 10.1 mg/dL Final   Glucose 87   70 - 99 mg/dL Final   Alkaline Phosphatase 65   40 - 150 U/L Final   AST 21   0 - 45 U/L Final   ALT 44   0 - 70 U/L Final   Protein Total 8.0   6.8 - 8.8 g/dL Final   Albumin 3.8   3.4 - 5.0 g/dL Final   Bilirubin Total 0.4   0.2 - 1.3 mg/dL Final   GFR Estimate >90   >60 mL/min/1.73m2 Final   Comprehensive metabolic panel shows normal glucose level (not diabetic), normal electrolytes (consisting of  potassium, chloride, sodium and calcium), normal hepatic/liver panel and normal renal/kidney function tests.                                          WBC Count 9.7   4.0 - 11.0 10e3/uL Final   RBC Count 5.06   4.40 - 5.90 10e6/uL Final   Hemoglobin 16.0   13.3 - 17.7 g/dL Final   Hematocrit 47.2   40.0 - 53.0 % Final   MCV 93   78 - 100 fL Final   MCH 31.6   26.5 - 33.0 pg Final   MCHC 33.9   31.5 - 36.5 g/dL Final   RDW 11.6   10.0 - 15.0 % Final   Platelet Count 229   150 - 450 10e3/uL Final   % Neutrophils 67    % Final   % Lymphocytes 21    % Final   % Monocytes 8    % Final   % Eosinophils 4    % Final   % Basophils 1    % Final   % Immature Granulocytes 0    % Final   Absolute Neutrophils 6.5   1.6 - 8.3 10e3/uL Final   Absolute Lymphocytes 2.0   0.8 - 5.3 10e3/uL Final   Absolute Monocytes 0.8   0.0 - 1.3 10e3/uL Final   Absolute Eosinophils 0.3   0.0 - 0.7 10e3/uL Final   Absolute Basophils 0.1   0.0 - 0.2 10e3/uL Final   Absolute Immature Granulocytes 0.0   <=0.4 10e3/uL Final     Normal CBC indicates no anemia (normal hemoglobin), normal WBC (no ongoing inflammatory/infectious diseases) and normal platelet count (not at risk of easy bruising).        Cholesterol 171   <200 mg/dL Final   Triglycerides 165   High   <150 mg/dL Final   Direct Measure HDL 30   Low   >=40 mg/dL Final   LDL Cholesterol Calculated 108   High   <=100 mg/dL Final   Non HDL Cholesterol 141   High   <130 mg/dL Final     Based on your current lipid panel, no reason to take medications since the calculated 10 year risk of heart disease is only  3.2%.      For any questions, you may call my office at [phone].    Sincerely,     David Cazares MD  Internal Medicine

## 2022-05-18 NOTE — PATIENT INSTRUCTIONS
At [facility], we strive to deliver an exceptional experience to you, every time we see you. If you receive a survey, please complete it as we do value your feedback.  If you have MyChart, you can expect to receive results automatically within 24 hours of their completion.  Your provider will send a note interpreting your results as well.   If you do not have MyChart, you should receive your results in about a week by mail.    Your care team:                            Family Medicine Internal Medicine   MD David San MD Shantel Branch-Fleming, MD Srinivasa Vaka, MD Katya Belousova, KEVIN RezaHillCR Frank CNP, MD Pediatrics   Peter Falcon, MD Kira Aguirre MD Amelia Massimini APRN CNP   Camila Metz APRN YAYA Martinez MD             Clinic hours: Monday - Thursday 7 am-6 pm; Fridays 7 am-5 pm.   Urgent care: Monday - Friday 10 am- 8 pm; Saturday and Sunday 9 am-5 pm.    Clinic: [phone]       [facility]: Monday - Thursday 8 am - 7 pm; Friday 8 am - 6 pm  [facility]: [phone]   Preventive Health Recommendations  Male Ages 40 to 49    Yearly exam:             See your health care provider every year in order to  o   Review health changes.   o   Discuss preventive care.    o   Review your medicines if your doctor has prescribed any.    You should be tested each year for STDs (sexually transmitted diseases) if you re at risk.     Have a cholesterol test every 5 years.     Have a colonoscopy (test for colon cancer) if someone in your family has had colon cancer or polyps before age 50.     After age 45, have a diabetes test (fasting glucose). If you are at risk for diabetes, you should have this test every 3 years.      Talk with your health care provider about whether or not a prostate cancer screening test (PSA) is right for you.    Shots:  Get a flu shot each year. Get a tetanus shot every 10 years.     Nutrition:    Eat at least 5 servings of fruits and vegetables daily.     Eat whole-grain bread, whole-wheat pasta and brown rice instead of white grains and rice.     Get adequate Calcium and Vitamin D.     Lifestyle    Exercise for at least 150 minutes a week (30 minutes a day, 5 days a week). This will help you control your weight and prevent disease.     Limit alcohol to one drink per day.     No smoking.     Wear sunscreen to prevent skin cancer.     See your dentist every six months for an exam and cleaning.

## 2022-05-28 ENCOUNTER — HOSPITAL ENCOUNTER (OUTPATIENT)
Facility: CLINIC | Age: 47
Discharge: HOME | End: 2022-05-28
Payer: COMMERCIAL

## 2022-05-28 DIAGNOSIS — Z12.11 SCREEN FOR COLON CANCER: ICD-10-CM

## 2022-05-28 LAB — HEMOCCULT STL QL IA: NEGATIVE

## 2022-05-28 PROCEDURE — 82274 ASSAY TEST FOR BLOOD FECAL: CPT

## 2022-07-28 ENCOUNTER — TELEPHONE (OUTPATIENT)
Dept: FAMILY MEDICINE | Facility: CLINIC | Age: 47
End: 2022-07-28

## 2022-07-28 NOTE — TELEPHONE ENCOUNTER
Routing to provider to review and advise. Please see message below.     Patient is requesting an interpretation of lab results on 5/18/22.    Liliana Khalil RN, BSN  [facility]

## 2022-10-11 ENCOUNTER — MEDICAL CORRESPONDENCE (OUTPATIENT)
Dept: HEALTH INFORMATION MANAGEMENT | Facility: CLINIC | Age: 47
End: 2022-10-11

## 2022-10-11 ENCOUNTER — TRANSFERRED RECORDS (OUTPATIENT)
Dept: HEALTH INFORMATION MANAGEMENT | Facility: CLINIC | Age: 47
End: 2022-10-11

## 2022-10-11 ENCOUNTER — TELEPHONE (OUTPATIENT)
Dept: FAMILY MEDICINE | Facility: CLINIC | Age: 47
End: 2022-10-11

## 2022-10-11 NOTE — TELEPHONE ENCOUNTER
Omar, assistant to Dr[facility] Mar[facility] with [facility] calling to request a care conference between Dr. Roy and PCP to discuss care of patient being treated at facility.    Omar states Dr. Roy is requesting care conference call and can reach Dr. Roy at [phone]    Routing to provider to review and advise.    Park Up RN    [facility]

## 2023-04-20 ENCOUNTER — PATIENT OUTREACH (OUTPATIENT)
Dept: CARE COORDINATION | Facility: CLINIC | Age: 48
End: 2023-04-20
Payer: COMMERCIAL

## 2023-05-04 ENCOUNTER — PATIENT OUTREACH (OUTPATIENT)
Dept: CARE COORDINATION | Facility: CLINIC | Age: 48
End: 2023-05-04
Payer: COMMERCIAL

## 2023-10-10 ENCOUNTER — TELEPHONE (OUTPATIENT)
Dept: FAMILY MEDICINE | Facility: CLINIC | Age: 48
End: 2023-10-10
Payer: COMMERCIAL

## 2023-10-10 NOTE — TELEPHONE ENCOUNTER
Received call from patient. Patient is calling as he is currently experiencing constant chest pain. He describes he has been experiencing this chest pain for the past 3 days, which worsens with coughing, yawning, and coughing.     Advised per protocol, patient should be seen in ED if he is experiencing chest pain. Patient verbalized understanding and plans to go to ED.    LALO Recinos RN  [facility]